# Patient Record
Sex: MALE | Race: BLACK OR AFRICAN AMERICAN | Employment: FULL TIME | URBAN - METROPOLITAN AREA
[De-identification: names, ages, dates, MRNs, and addresses within clinical notes are randomized per-mention and may not be internally consistent; named-entity substitution may affect disease eponyms.]

---

## 2020-01-26 ENCOUNTER — HOSPITAL ENCOUNTER (EMERGENCY)
Age: 30
Discharge: HOME | End: 2020-01-27
Attending: EMERGENCY MEDICINE
Payer: MEDICAID

## 2020-01-26 VITALS
SYSTOLIC BLOOD PRESSURE: 121 MMHG | DIASTOLIC BLOOD PRESSURE: 78 MMHG | HEART RATE: 100 BPM | OXYGEN SATURATION: 97 % | TEMPERATURE: 98 F | RESPIRATION RATE: 18 BRPM | BODY MASS INDEX: 30.38 KG/M2 | HEIGHT: 71 IN | WEIGHT: 217 LBS

## 2020-01-26 DIAGNOSIS — S61.214A LACERATION OF RIGHT RING FINGER WITHOUT FOREIGN BODY WITHOUT DAMAGE TO NAIL, INITIAL ENCOUNTER: Primary | ICD-10-CM

## 2020-01-26 PROCEDURE — 99283 EMERGENCY DEPT VISIT LOW MDM: CPT

## 2020-01-26 PROCEDURE — 90471 IMMUNIZATION ADMIN: CPT

## 2020-01-26 PROCEDURE — 90715 TDAP VACCINE 7 YRS/> IM: CPT | Performed by: NURSE PRACTITIONER

## 2020-01-26 PROCEDURE — 74011250636 HC RX REV CODE- 250/636: Performed by: NURSE PRACTITIONER

## 2020-01-26 PROCEDURE — 75810000293 HC SIMP/SUPERF WND  RPR

## 2020-01-26 PROCEDURE — 74011000250 HC RX REV CODE- 250: Performed by: NURSE PRACTITIONER

## 2020-01-26 RX ORDER — LIDOCAINE HYDROCHLORIDE 20 MG/ML
10 INJECTION, SOLUTION INFILTRATION; PERINEURAL
Status: COMPLETED | OUTPATIENT
Start: 2020-01-26 | End: 2020-01-26

## 2020-01-26 RX ORDER — BUPIVACAINE HYDROCHLORIDE 5 MG/ML
1 INJECTION, SOLUTION EPIDURAL; INTRACAUDAL
Status: COMPLETED | OUTPATIENT
Start: 2020-01-26 | End: 2020-01-26

## 2020-01-26 RX ADMIN — LIDOCAINE HYDROCHLORIDE 200 MG: 20 INJECTION, SOLUTION INFILTRATION; PERINEURAL at 23:25

## 2020-01-26 RX ADMIN — BUPIVACAINE HYDROCHLORIDE 5 MG: 5 INJECTION, SOLUTION EPIDURAL; INTRACAUDAL; PERINEURAL at 23:25

## 2020-01-26 RX ADMIN — TETANUS TOXOID, REDUCED DIPHTHERIA TOXOID AND ACELLULAR PERTUSSIS VACCINE, ADSORBED 0.5 ML: 5; 2.5; 8; 8; 2.5 SUSPENSION INTRAMUSCULAR at 23:25

## 2020-01-27 ENCOUNTER — APPOINTMENT (OUTPATIENT)
Dept: GENERAL RADIOLOGY | Age: 30
End: 2020-01-27
Attending: NURSE PRACTITIONER
Payer: MEDICAID

## 2020-01-27 PROCEDURE — 73130 X-RAY EXAM OF HAND: CPT

## 2020-01-27 PROCEDURE — 74011000250 HC RX REV CODE- 250: Performed by: NURSE PRACTITIONER

## 2020-01-27 PROCEDURE — 75810000293 HC SIMP/SUPERF WND  RPR

## 2020-01-27 RX ADMIN — BACITRACIN ZINC, NEOMYCIN SULFATE, POLYMYXIN B SULFATE 1 PACKET: 3.5; 5000; 4 OINTMENT TOPICAL at 00:18

## 2020-01-27 NOTE — ED NOTES
Pt presents ambulatory to ED complaining of scattered lacerations to R 4th and 5th fingers after punching a car window during an argument. L to R 4th knuckle is the largest at 1 cm, bleeding controlled without pressure, ice applied. Pt not sure about tetanus status. Pt reports having feelings of SI earlier today, but is not currently experiencing SI or HI. Pt says he has been off of his anxiety and blood pressure medications since November because, \"I need to find a doctor that works with my insurance. \"  Pt is alert and oriented x 4, RR even and unlabored, skin is warm and dry. Assesment completed and pt updated on plan of care. Emergency Department Nursing Plan of Care       The Nursing Plan of Care is developed from the Nursing assessment and Emergency Department Attending provider initial evaluation. The plan of care may be reviewed in the ED Provider note.     The Plan of Care was developed with the following considerations:   Patient / Family readiness to learn indicated by:verbalized understanding  Persons(s) to be included in education: patient  Barriers to Learning/Limitations:No    Signed     [address], RN    1/26/2020   11:03 PM

## 2020-01-27 NOTE — ED TRIAGE NOTES
Pt with laceration to rt hand after punching a car window.  Bleeding control hand cleansed with normal saline and wrap with gauge and lynn

## 2020-01-27 NOTE — ED NOTES
Discharge instructions were given to the patient by Suresh Betancur RN. The patient left the Emergency Department ambulatory, alert and oriented and in no acute distress with 0 prescriptions. The patient was encouraged to call or return to the ED for worsening issues or problems and was encouraged to schedule a follow up appointment for continuing care. The patient verbalized understanding of discharge instructions and prescriptions, all questions were answered. The patient has no further concerns at this time. Pt to return for suture removal in 1 week.

## 2020-01-27 NOTE — ED PROVIDER NOTES
EMERGENCY DEPARTMENT HISTORY AND PHYSICAL EXAM    Date: 1/26/2020  Patient Name: Zenia Magana    History of Presenting Illness     Chief Complaint   Patient presents with    Hand Pain     According to pt he punched a ca r window. + laceration to thumb, 4th  knuckle         History Provided By: Patient    HPI: Zenia Magana is a 34 y.o. male with a PMH of No significant past medical history who presents with hand pain. Onset today. Patient states he punched a car window. States he sustained a laceration to his right fourth finger. States bleeding has stopped with pressure. Reports limited movement of right fifth and fourth finger. Mild swelling reported but no deformity redness or warmth. Patient is unsure of last tetanus. PCP: None        Past History     Past Medical History:  Past Medical History:   Diagnosis Date    Anxiety     HTN (hypertension)        Past Surgical History:  Past Surgical History:   Procedure Laterality Date    HX HEENT      LEFT EAR       Family History:  History reviewed. No pertinent family history. Social History:  Social History     Tobacco Use    Smoking status: Current Every Day Smoker     Packs/day: 1.00     Years: 1.00     Pack years: 1.00    Smokeless tobacco: Never Used   Substance Use Topics    Alcohol use: Yes     Comment: reports three 40 oz beers per day    Drug use: No       Allergies:  No Known Allergies      Review of Systems   Review of Systems   Constitutional: Negative for chills and fever. Respiratory: Negative for cough. Cardiovascular: Negative for chest pain. Musculoskeletal: Positive for arthralgias and joint swelling. Skin: Positive for wound. Negative for pallor. Neurological: Negative for weakness and numbness. All other systems reviewed and are negative.       Physical Exam     Vitals:    01/26/20 2227   BP: 121/78   Pulse: 100   Resp: 18   Temp: 98 °F (36.7 °C)   SpO2: 97%   Weight: 98.4 kg (217 lb)   Height: 5' 11\" (1.803 m) Physical Exam  Vitals signs and nursing note reviewed. Constitutional:       General: He is not in acute distress. Appearance: He is well-developed. Cardiovascular:      Rate and Rhythm: Normal rate and regular rhythm. Pulses: Normal pulses. Heart sounds: Normal heart sounds. Pulmonary:      Effort: Pulmonary effort is normal.      Breath sounds: Normal breath sounds. Musculoskeletal:      Right wrist: Normal.      Right hand: He exhibits decreased range of motion (flexion of R little finger ), bony tenderness (DIP and PIP of right little finger and PIP of right ring finger) and laceration. He exhibits normal capillary refill, no deformity and no swelling. Normal sensation noted. Normal strength noted. Comments: Radial pulse 2+   Skin:     Findings: Abrasion (R thumb, distal R ring finger ) and laceration present. Comments: 1 cm horizontal laceration to PIP of right ring finger. Neurological:      Mental Status: He is alert and oriented to person, place, and time. GCS: GCS eye subscore is 4. GCS verbal subscore is 5. GCS motor subscore is 6. Cranial Nerves: No cranial nerve deficit. Psychiatric:         Thought Content: Thought content normal.           Diagnostic Study Results     Labs -   No results found for this or any previous visit (from the past 12 hour(s)). Radiologic Studies -   XR HAND RT MIN 3 V   Final Result   IMPRESSION:   No acute fracture. CT Results  (Last 48 hours)    None        CXR Results  (Last 48 hours)    None            Medical Decision Making   I am the first provider for this patient. I reviewed the vital signs, available nursing notes, past medical history, past surgical history, family history and social history. Vital Signs-Reviewed the patient's vital signs.     Records Reviewed: Nursing Notes and Old Medical Records      ED Course as of Jan 27 1955   Mon Jan 27, 2020   2296 Sign out given to Dr Cat Jasmine whom will provide further disposition of patient     [NA]      ED Course User Index  [NA] Zane Grissom NP          Disposition:  Discharge    DISCHARGE NOTE:         Follow-up Information     Follow up With Specialties Details Why [address] [facility] EMERGENCY DEPT Emergency Medicine In 1 week For suture removal [address] [facility]          There are no discharge medications for this patient. Provider Notes (Medical Decision Making):   DDX: laceration, abrasion, boxer fracture, phalanx fracture, hand sprain     Procedures:  Wound Repair  Date/Time: 1/27/2020 12:00 AM  Performed by: NPPreparation: skin prepped with Betadine  Location details: right ring finger  Wound length:2.5 cm or less  Anesthesia: digital block    Anesthesia:  Local Anesthetic: lidocaine 2% without epinephrine and bupivacaine 0.5% without epinephrine  Anesthetic total: 3 mL  Irrigation solution: saline  Irrigation method: syringe  Debridement: none  Skin closure: 5-0 nylon  Number of sutures: 3  Technique: simple  Approximation: close  Dressing: antibiotic ointment  Patient tolerance: Patient tolerated the procedure well with no immediate complications  My total time at bedside, performing this procedure was 1-15 minutes. Please note that this dictation was completed with Dragon, computer voice recognition software. Quite often unanticipated grammatical, syntax, homophones, and other interpretive errors are inadvertently transcribed by the computer software. Please disregard these errors. Additionally, please excuse any errors that have escaped final proofreading. Diagnosis     Clinical Impression:   1.  Laceration of right ring finger without foreign body without damage to nail, initial encounter

## 2020-01-27 NOTE — DISCHARGE INSTRUCTIONS

## 2020-02-04 ENCOUNTER — OFFICE VISIT (OUTPATIENT)
Dept: FAMILY MEDICINE CLINIC | Age: 30
End: 2020-02-04

## 2020-02-04 VITALS
BODY MASS INDEX: 30.44 KG/M2 | SYSTOLIC BLOOD PRESSURE: 124 MMHG | RESPIRATION RATE: 20 BRPM | DIASTOLIC BLOOD PRESSURE: 82 MMHG | HEART RATE: 85 BPM | WEIGHT: 217.4 LBS | OXYGEN SATURATION: 99 % | HEIGHT: 71 IN | TEMPERATURE: 97.1 F

## 2020-02-04 DIAGNOSIS — F10.20 UNCOMPLICATED ALCOHOL DEPENDENCE (HCC): Primary | ICD-10-CM

## 2020-02-04 RX ORDER — LANOLIN ALCOHOL/MO/W.PET/CERES
50 CREAM (GRAM) TOPICAL DAILY
Qty: 30 TAB | Refills: 0 | Status: SHIPPED | OUTPATIENT
Start: 2020-02-04 | End: 2020-06-17 | Stop reason: SDUPTHER

## 2020-02-04 RX ORDER — LANOLIN ALCOHOL/MO/W.PET/CERES
1000 CREAM (GRAM) TOPICAL DAILY
Qty: 30 TAB | Refills: 6 | Status: SHIPPED | OUTPATIENT
Start: 2020-02-04 | End: 2020-06-17 | Stop reason: ALTCHOICE

## 2020-02-04 RX ORDER — BUSPIRONE HYDROCHLORIDE 10 MG/1
10 TABLET ORAL 3 TIMES DAILY
COMMUNITY
End: 2020-06-17 | Stop reason: ALTCHOICE

## 2020-02-04 RX ORDER — PAROXETINE 30 MG/1
30 TABLET, FILM COATED ORAL DAILY
Qty: 30 TAB | Refills: 0 | Status: SHIPPED | OUTPATIENT
Start: 2020-02-04 | End: 2022-08-16 | Stop reason: ALTCHOICE

## 2020-02-04 RX ORDER — TOPIRAMATE 50 MG/1
TABLET, FILM COATED ORAL DAILY
COMMUNITY
End: 2020-06-17 | Stop reason: SDUPTHER

## 2020-02-04 RX ORDER — NALTREXONE HYDROCHLORIDE 50 MG/1
50 TABLET, FILM COATED ORAL DAILY
Qty: 30 TAB | Refills: 0 | Status: SHIPPED | OUTPATIENT
Start: 2020-02-04 | End: 2020-06-17 | Stop reason: ALTCHOICE

## 2020-02-04 NOTE — PROGRESS NOTES
Name and  verified      Chief Complaint   Patient presents with   [address] Patient       Patient stated have not had a PCP. Patient stated takes blood pressure medications unsure of dose, name and directions encouraged to call or bring on next office visit patient acknowledged understanding.

## 2020-02-04 NOTE — PROGRESS NOTES
HISTORY OF PRESENT ILLNESS  Mj Spicer is a 34 y.o. male. HPI  Pt states that he does daily of beers 4x40 z, he also states that [place] member states that he recently finsihed the rehab and was stationed and compliant, Stayed in rehab of >30 days,   Pt LFT and few vitamins were checked secondary to his etoh dependence, currently likes his daily beers, but slowed down, denies nausea vomiting, has no abd pain, having nl bowl movement, having no rash, is a smoker,     Depression with Anxiety state no fhx of bipolar state    Patient state that thins are not getting better: pt states that  unable to sleep, , unable to concentrate at work and at home at this time, +feeling anxius, no guilty feeling, has no nl interest to do things till he drinks, feeling depressed, nl appetite,  No Hoplessness, not wanted to heart self or others, no weight gain or loss,    In addition pt states that is not the first time,  no hx of physical nor of mental abuse, and currently is feeling safe in general.    Current Outpatient Medications   Medication Sig Dispense Refill    busPIRone (BUSPAR) 10 mg tablet Take 10 mg by mouth three (3) times daily.  topiramate (TOPAMAX) 50 mg tablet Take  by mouth daily.  naltrexone (DEPADE) 50 mg tablet Take 1 Tab by mouth daily. 30 Tab 0    PARoxetine (PAXIL) 30 mg tablet Take 1 Tab by mouth daily. 30 Tab 0    cyanocobalamin (VITAMIN B-12) 1,000 mcg tablet Take 1 Tab by mouth daily. 30 Tab 6    pyridoxine, vitamin B6, (VITAMIN B-6) 50 mg tablet Take 1 Tab by mouth daily. 30 Tab 0     No Known Allergies  Past Medical History:   Diagnosis Date    Anxiety     HTN (hypertension)      Past Surgical History:   Procedure Laterality Date    HX HEENT      LEFT EAR     No family history on file.   Social History     Tobacco Use    Smoking status: Current Every Day Smoker     Packs/day: 1.00     Years: 12.00     Pack years: 12.00     Types: Cigarettes     Start date: 2008    Smokeless tobacco: Never Used   Substance Use Topics    Alcohol use: Yes     Comment: reports three 40 oz beers per day      Lab Results   Component Value Date/Time    Glucose 86 11/12/2014 12:49 AM    Creatinine 0.95 11/12/2014 12:49 AM      No results found for: CHOL, CHOLPOCT, HDL, LDL, LDLC, LDLCPOC, LDLCEXT, TRIGL, TGLPOCT, CHHD, CHHDX     Review of Systems   Constitutional: Positive for malaise/fatigue. Negative for chills and fever. HENT: Negative for ear pain and nosebleeds. Eyes: Negative for blurred vision, pain and discharge. Respiratory: Negative for shortness of breath. Cardiovascular: Negative for chest pain and leg swelling. Gastrointestinal: Negative for constipation, diarrhea, nausea and vomiting. Genitourinary: Negative for frequency. Musculoskeletal: Positive for myalgias. Negative for joint pain. Skin: Negative for itching and rash. Neurological: Negative for headaches. Psychiatric/Behavioral: Positive for depression. Negative for hallucinations, substance abuse and suicidal ideas. The patient is nervous/anxious and has insomnia. Physical Exam  Vitals signs and nursing note reviewed. Constitutional:       Appearance: He is well-developed. HENT:      Head: Normocephalic and atraumatic. Mouth/Throat:      Pharynx: No oropharyngeal exudate. Eyes:      Conjunctiva/sclera: Conjunctivae normal.   Neck:      Musculoskeletal: Normal range of motion and neck supple. Cardiovascular:      Rate and Rhythm: Normal rate and regular rhythm. Heart sounds: Normal heart sounds. No murmur. Pulmonary:      Effort: Pulmonary effort is normal. No respiratory distress. Breath sounds: Normal breath sounds. Abdominal:      General: Bowel sounds are normal. There is no distension. Palpations: Abdomen is soft. Tenderness: There is no rebound. Musculoskeletal:         General: No tenderness. Skin:     General: Skin is warm.       Findings: No erythema. Neurological:      Mental Status: He is alert and oriented to person, place, and time. Psychiatric:         Attention and Perception: He is attentive. He does not perceive auditory hallucinations. Mood and Affect: Affect is flat. Speech: Speech is delayed. Behavior: Behavior is slowed, withdrawn and hyperactive. Behavior is not agitated, aggressive or combative. Behavior is cooperative. Thought Content: Thought content normal.         Cognition and Memory: Cognition normal. Memory is impaired. Judgment: Judgment is not impulsive or inappropriate. ASSESSMENT and PLAN  Diagnoses and all orders for this visit:    1. Uncomplicated alcohol dependence ([facility] [address]  -     REFERRAL TO SOCIAL WORK    Other orders  -     naltrexone (DEPADE) 50 mg tablet; Take 1 Tab by mouth daily.  -     PARoxetine (PAXIL) 30 mg tablet; Take 1 Tab by mouth daily. -     cyanocobalamin (VITAMIN B-12) 1,000 mcg tablet; Take 1 Tab by mouth daily. -     pyridoxine, vitamin B6, (VITAMIN B-6) 50 mg tablet; Take 1 Tab by mouth daily.       Pt was told to the Initial tx, he will need some form of alcohol counseling and participation in a mutual help group,   Was told that his med the Naltrexone can be initiated while the individual is still drinking, pt was told if has Poor adherence, then will consider long-acting injectable, today pt was told that the Abstinence will be the primary goal of treatment of alcohol use disorder,  Family was adivised for the to observe if patients taking medication , the will evalute in 1 wk to addresse the pt's Response to tx,  pt and the family agreed with todays recommend, >40min spent with pt's

## 2020-02-04 NOTE — LETTER
NOTIFICATION RETURN TO WORK / SCHOOL 
 
2/4/2020 10:31 AM 
 
Mr. Santosh Bar [address] [facility] [address] To Whom It May Concern: 
 
Santosh Bar is currently under the care of [address] [facility]. He will return to work/school on: 2/4/20120 If there are questions or concerns please have the patient contact our office.  
 
 
 
Sincerely, 
 
 
Wendy Cardenas MD

## 2020-02-29 ENCOUNTER — HOSPITAL ENCOUNTER (EMERGENCY)
Age: 30
Discharge: HOME | End: 2020-03-01
Attending: EMERGENCY MEDICINE | Admitting: EMERGENCY MEDICINE
Payer: MEDICAID

## 2020-02-29 ENCOUNTER — APPOINTMENT (OUTPATIENT)
Dept: GENERAL RADIOLOGY | Age: 30
End: 2020-02-29
Attending: EMERGENCY MEDICINE
Payer: MEDICAID

## 2020-02-29 DIAGNOSIS — R07.9 CHEST PAIN, UNSPECIFIED TYPE: Primary | ICD-10-CM

## 2020-02-29 LAB — TROPONIN I SERPL-MCNC: <0.05 NG/ML

## 2020-02-29 PROCEDURE — 99284 EMERGENCY DEPT VISIT MOD MDM: CPT

## 2020-02-29 PROCEDURE — 74011000250 HC RX REV CODE- 250: Performed by: EMERGENCY MEDICINE

## 2020-02-29 PROCEDURE — 71045 X-RAY EXAM CHEST 1 VIEW: CPT

## 2020-02-29 PROCEDURE — 93005 ELECTROCARDIOGRAM TRACING: CPT

## 2020-02-29 PROCEDURE — 84484 ASSAY OF TROPONIN QUANT: CPT

## 2020-02-29 PROCEDURE — 74011250637 HC RX REV CODE- 250/637: Performed by: EMERGENCY MEDICINE

## 2020-02-29 PROCEDURE — 36415 COLL VENOUS BLD VENIPUNCTURE: CPT

## 2020-02-29 RX ADMIN — LIDOCAINE HYDROCHLORIDE 40 ML: 20 SOLUTION ORAL; TOPICAL at 23:10

## 2020-03-01 VITALS
SYSTOLIC BLOOD PRESSURE: 147 MMHG | RESPIRATION RATE: 12 BRPM | BODY MASS INDEX: 29.82 KG/M2 | TEMPERATURE: 98.5 F | HEIGHT: 71 IN | HEART RATE: 83 BPM | WEIGHT: 213 LBS | DIASTOLIC BLOOD PRESSURE: 95 MMHG | OXYGEN SATURATION: 97 %

## 2020-03-01 NOTE — ED NOTES
Pt reports GI cocktail did not have any affect on pain. Will make provider aware. Pt resting contently in room, in no acute distress, and updated on plan of care. Call light within reach and pt has family at bedside.

## 2020-03-01 NOTE — ED PROVIDER NOTES
EMERGENCY DEPARTMENT HISTORY AND PHYSICAL EXAM      Date: 2/29/2020  Patient Name: Stanton Regalado    History of Presenting Illness     Chief Complaint   Patient presents with    Headache    Chest Pain       History Provided By: Patient    HPI: Stanton Regalado, 34 y.o. male with PMHx significant for no medical history, presents by private vehicle to the ED with cc of central chest pain. This is a 19-year-old male with central chest pain for 3 days. No nausea vomiting or diarrhea with this. No fever or cough. Patient states the pain feels like heartburn. He has no history of heart conditions or lung conditions. There are no other complaints, changes, or physical findings at this time. PCP: Coco Reynolds MD    Current Outpatient Medications   Medication Sig Dispense Refill    busPIRone (BUSPAR) 10 mg tablet Take 10 mg by mouth three (3) times daily.  topiramate (TOPAMAX) 50 mg tablet Take  by mouth daily.  naltrexone (DEPADE) 50 mg tablet Take 1 Tab by mouth daily. 30 Tab 0    PARoxetine (PAXIL) 30 mg tablet Take 1 Tab by mouth daily. 30 Tab 0    cyanocobalamin (VITAMIN B-12) 1,000 mcg tablet Take 1 Tab by mouth daily. 30 Tab 6    pyridoxine, vitamin B6, (VITAMIN B-6) 50 mg tablet Take 1 Tab by mouth daily. 30 Tab 0       Past History     Past Medical History:  Past Medical History:   Diagnosis Date    Anxiety     HTN (hypertension)        Past Surgical History:  Past Surgical History:   Procedure Laterality Date    HX HEENT      LEFT EAR       Family History:  History reviewed. No pertinent family history.     Social History:  Social History     Tobacco Use    Smoking status: Current Every Day Smoker     Packs/day: 1.00     Years: 12.00     Pack years: 12.00     Types: Cigarettes     Start date: 2008    Smokeless tobacco: Never Used   Substance Use Topics    Alcohol use: Yes     Comment: reports three 40 oz beers per day    Drug use: Yes     Types: Cocaine       Allergies:  No Known Allergies      Review of Systems   Review of Systems   Constitutional: Negative for chills and fever. HENT: Negative for congestion, rhinorrhea, sneezing and sore throat. Eyes: Negative for redness and visual disturbance. Respiratory: Negative for shortness of breath. Cardiovascular: Positive for chest pain. Negative for leg swelling. Gastrointestinal: Negative for abdominal pain, nausea and vomiting. Genitourinary: Negative for difficulty urinating and frequency. Musculoskeletal: Negative for back pain, myalgias and neck stiffness. Skin: Negative for rash. Neurological: Negative for dizziness, syncope, weakness and headaches. Hematological: Negative for adenopathy. All other systems reviewed and are negative. Physical Exam   Physical Exam  Vitals signs and nursing note reviewed. Constitutional:       Appearance: Normal appearance. He is well-developed. HENT:      Head: Normocephalic and atraumatic. Neck:      Musculoskeletal: Full passive range of motion without pain, normal range of motion and neck supple. Cardiovascular:      Rate and Rhythm: Normal rate and regular rhythm. Pulses: Normal pulses. Heart sounds: Normal heart sounds. No murmur. Pulmonary:      Effort: Pulmonary effort is normal. No respiratory distress. Breath sounds: Normal breath sounds. Chest:      Chest wall: No tenderness. Abdominal:      General: Bowel sounds are normal.      Palpations: Abdomen is soft. Tenderness: There is no abdominal tenderness. There is no guarding or rebound. Skin:     General: Skin is warm and dry. Findings: No erythema or rash. Neurological:      Mental Status: He is alert and oriented to person, place, and time. Psychiatric:         Speech: Speech normal.         Behavior: Behavior normal.         Thought Content:  Thought content normal.         Judgment: Judgment normal.         Diagnostic Study Results     Labs -     Recent Results (from the past 12 hour(s))   EKG, 12 LEAD, INITIAL    Collection Time: 02/29/20 10:55 PM   Result Value Ref Range    Ventricular Rate 89 BPM    Atrial Rate 89 BPM    P-R Interval 156 ms    QRS Duration 82 ms    Q-T Interval 364 ms    QTC Calculation (Bezet) 442 ms    Calculated P Axis 72 degrees    Calculated R Axis 65 degrees    Calculated T Axis 43 degrees    Diagnosis       Normal sinus rhythm  Early repolarization  Normal ECG  When compared with ECG of 27-APR-2012 16:45,  QT has lengthened     TROPONIN I    Collection Time: 02/29/20 11:20 PM   Result Value Ref Range    Troponin-I, Qt. <0.05 <0.05 ng/mL       Radiologic Studies -   XR CHEST PORT   Final Result   Impression:   No acute cardiopulmonary process. CT Results  (Last 48 hours)    None        CXR Results  (Last 48 hours)               02/29/20 2325  XR CHEST PORT Final result    Impression:  Impression:   No acute cardiopulmonary process. Narrative:  Chest portable AP       History: Chest pain       Comparison: 4/27/2012       Findings: The lungs are well expanded. No focal consolidation, pleural   effusion, or pneumothorax. The cardiomediastinal silhouette is unremarkable. The visualized osseous structures are unremarkable. Medical Decision Making   I am the first provider for this patient. I reviewed the vital signs, available nursing notes, past medical history, past surgical history, family history and social history. Vital Signs-Reviewed the patient's vital signs. Patient Vitals for the past 12 hrs:   Temp Pulse Resp BP SpO2   03/01/20 0000  83 12 (!) 147/95 97 %   02/29/20 2306  88 14  99 %   02/29/20 2305    (!) 139/97    02/29/20 2242 98.5 °F (36.9 °C) 93 16 (!) 150/93 98 %         Records Reviewed: Nursing Notes    Provider Notes (Medical Decision Making):   Chest wall pain versus arrhythmia. ED Course:   Initial assessment performed.  The patients presenting problems have been discussed, and they are in agreement with the care plan formulated and outlined with them. I have encouraged them to ask questions as they arise throughout their visit. EKG is normal sinus rhythm with no ectopy or ST changes. Troponin is also negative as is chest x-ray and patient is suitable for discharge to home. He feels better with a GI cocktail. Disposition:  Patient informed of results of workup and is comfortable with discharge to home to follow up with PCP. They are instructed to return as needed for worsening condition. PLAN:  1. Discharge Medication List as of 3/1/2020 12:05 AM        2. Follow-up Information     Follow up With Specialties Details Why Contact Info    Marko Aguilar MD Family Practice Schedule an appointment as soon as possible for a visit  [address] [place] [address]       [facility] EMERGENCY DEPT Emergency Medicine  As needed, If symptoms worsen Roman Solares  [phone]        Return to ED if worse     Diagnosis     Clinical Impression:   1.  Chest pain, unspecified type

## 2020-03-01 NOTE — ED NOTES
Pt presents to ED ambulatory complaining of \"burning\" chest pain x 3 days. Pt denies SOB or abdominal pain. Pt is alert and oriented x 4, RR even and unlabored, skin is warm and dry. Assessment completed and pt updated on plan of care. Emergency Department Nursing Plan of Care       The Nursing Plan of Care is developed from the Nursing assessment and Emergency Department Attending provider initial evaluation. The plan of care may be reviewed in the ED Provider note.     The Plan of Care was developed with the following considerations:   Patient / Family readiness to learn indicated by:verbalized understanding  Persons(s) to be included in education: patient  Barriers to Learning/Limitations:No    Signed     Sammy Jan 2/29/2020   10:58 PM

## 2020-03-01 NOTE — ED NOTES
Discharge instructions were given to the patient by Eric Guillaume RN. The patient left the Emergency Department ambulatory, alert and oriented and in no acute distress with 0 prescriptions. The patient was encouraged to call or return to the ED for worsening issues or problems and was encouraged to schedule a follow up appointment for continuing care. The patient verbalized understanding of discharge instructions and prescriptions, all questions were answered. The patient has no further concerns at this time.

## 2020-03-01 NOTE — DISCHARGE INSTRUCTIONS

## 2020-03-01 NOTE — ED TRIAGE NOTES
Headaches for \"awhile in my life\" but I never got it checked. Sharp chest pain starting three days ago.   Reports that it hurts to breathe

## 2020-03-02 LAB
ATRIAL RATE: 89 BPM
CALCULATED P AXIS, ECG09: 72 DEGREES
CALCULATED R AXIS, ECG10: 65 DEGREES
CALCULATED T AXIS, ECG11: 43 DEGREES
DIAGNOSIS, 93000: NORMAL
P-R INTERVAL, ECG05: 156 MS
Q-T INTERVAL, ECG07: 364 MS
QRS DURATION, ECG06: 82 MS
QTC CALCULATION (BEZET), ECG08: 442 MS
VENTRICULAR RATE, ECG03: 89 BPM

## 2020-05-07 ENCOUNTER — VIRTUAL VISIT (OUTPATIENT)
Dept: FAMILY MEDICINE CLINIC | Age: 30
End: 2020-05-07

## 2020-05-07 DIAGNOSIS — F48.9 NO DIAGNOSIS ON AXIS I: Primary | ICD-10-CM

## 2020-05-07 NOTE — PROGRESS NOTES
Attempted to contact client for scheduled virtual visit. He did not respond to Doxy texts. Called him on phone. He did answer, but seem perplexed as to why this writer was calling. Explained to him that he was referred for counseling by PCP. He states he could not talk today but did want to reschedule. Appt. Made for May 21 @3pm. Client informed.

## 2020-05-21 ENCOUNTER — DOCUMENTATION ONLY (OUTPATIENT)
Dept: FAMILY MEDICINE CLINIC | Age: 30
End: 2020-05-21

## 2020-05-21 NOTE — PROGRESS NOTES
Attempted to reach client via doxy platform for scheduled virtual visit--no response. Tried to call his phone--also no response. Left voicemail that client will need to call office to reschedule.

## 2020-06-17 ENCOUNTER — VIRTUAL VISIT (OUTPATIENT)
Dept: FAMILY MEDICINE CLINIC | Age: 30
End: 2020-06-17

## 2020-06-17 DIAGNOSIS — F33.9 RECURRENT DEPRESSIVE DISORDER (HCC): ICD-10-CM

## 2020-06-17 DIAGNOSIS — F10.20 UNCOMPLICATED ALCOHOL DEPENDENCE (HCC): Primary | ICD-10-CM

## 2020-06-17 DIAGNOSIS — Z71.89 ADVICE GIVEN ABOUT COVID-19 VIRUS INFECTION: ICD-10-CM

## 2020-06-17 DIAGNOSIS — R53.83 OTHER FATIGUE: ICD-10-CM

## 2020-06-17 RX ORDER — BUSPIRONE HYDROCHLORIDE 10 MG/1
10 TABLET ORAL 3 TIMES DAILY
Qty: 42 TAB | Refills: 0 | Status: SHIPPED | OUTPATIENT
Start: 2020-06-17 | End: 2020-07-01

## 2020-06-17 RX ORDER — TOPIRAMATE 50 MG/1
50 TABLET, FILM COATED ORAL 2 TIMES DAILY
Qty: 30 TAB | Refills: 0 | Status: SHIPPED | OUTPATIENT
Start: 2020-06-17 | End: 2022-08-16 | Stop reason: ALTCHOICE

## 2020-06-17 RX ORDER — LANOLIN ALCOHOL/MO/W.PET/CERES
50 CREAM (GRAM) TOPICAL DAILY
Qty: 30 TAB | Refills: 0 | Status: SHIPPED | OUTPATIENT
Start: 2020-06-17 | End: 2022-08-16 | Stop reason: ALTCHOICE

## 2020-06-17 NOTE — PROGRESS NOTES
Chief Complaint   Patient presents with    Medication Evaluation     naltrexone      1. Have you been to the ER, urgent care clinic since your last visit? Hospitalized since your last visit? No    2. Have you seen or consulted any other health care providers outside of the [address] [place] [address] since your last visit? Include any pap smears or colon screening. No    Call placed to pt.  Verified patient with two type of identifiers, c/o naltrexone is too strong, stated made him nausea and didn't want to do anything,  Stopped taking med, now is drinking and having increased anxiety

## 2020-06-17 NOTE — PATIENT INSTRUCTIONS
Learning About Alcohol Use Disorder What is alcohol use disorder? Alcohol use disorder means that a person drinks alcohol even though it causes harm to themselves or others. It can range from mild to severe. The more signs of this disorder you have, the more severe it may be. Moderate to severe alcohol use disorder is sometimes called addiction. People who have it may find it hard to control their use of alcohol. People who have this disorder may argue with others about how much they're drinking. Their job may be affected because of drinking. They may drink when it's dangerous or illegal, such as when they drive. They also may have a strong need, or craving, to drink. They may feel like they must drink just to get by. Their drinking may increase their risk of getting hurt or being in a car crash. Over time, drinking too much alcohol may cause health problems. These may include high blood pressure, liver problems, or problems with digestion. What are the signs? Maybe you've wondered about your alcohol habits, or how to tell if your drinking is becoming a problem. Here are some of the signs of alcohol use disorder. You may have it if you have two or more of the following signs: 
· You drink larger amounts of alcohol than you ever meant to. Or you've been drinking for a longer time than you ever meant to. · You can't cut down or control your use. Or you constantly wish you could cut down. · You spend a lot of time getting or drinking alcohol or recovering from its effects. · You have strong cravings for alcohol. · You can no longer do your main jobs at work, at school, or at home. · You keep drinking alcohol, even though your use hurts your relationships. · You have stopped doing important activities because of your alcohol use. · You drink alcohol in situations where doing so is dangerous. · You keep drinking alcohol even though you know it's causing health problems. · You need more and more alcohol to get the same effect, or you get less effect from the same amount over time. This is called tolerance. · You have uncomfortable symptoms when you stop drinking alcohol or use less. This is called withdrawal. 
Alcohol use disorder can range from mild to severe. The more signs you have, the more severe the disorder may be. Moderate to severe alcohol use disorder is sometimes called addiction. You might not realize that your drinking is a problem. You might not drink large amounts when you drink. Or you might go for days or weeks between drinking episodes. But even if you don't drink very often, your drinking could still be harmful and put you at risk. How is alcohol use disorder treated? Getting help is up to you. But you don't have to do it alone. There are many people and kinds of treatments that can help. Treatment for alcohol use disorder can include: · Group therapy, one or more types of counseling, and alcohol education. · Medicines that help to: 
? Reduce withdrawal symptoms and help you safely stop drinking. ? Reduce cravings for alcohol. · Support groups. These groups include Alcoholics Anonymous and AGC (Self-Management and Recovery Training). Some people are able to stop or cut back on drinking with help from a counselor. People who have moderate to severe alcohol use disorder may need medical treatment. They may need to stay in a hospital or treatment center. You may have a treatment team to help you. This team may include a psychologist or psychiatrist, counselors, doctors, social workers, nurses, and a . A  helps plan and manage your treatment. Follow-up care is a key part of your treatment and safety. Be sure to make and go to all appointments, and call your doctor if you are having problems. It's also a good idea to know your test results and keep a list of the medicines you take. Where can you learn more? Go to http://shauna-alban.info/ Enter [id number] in the search box to learn more about \"Learning About Alcohol Use Disorder. \" Current as of: August 22, 2019               Content Version: 12.5 © 0710-8265 [employer]. Care instructions adapted under license by [employer] (which disclaims liability or warranty for this information). If you have questions about a medical condition or this instruction, always ask your healthcare professional. [place] [address] any warranty or liability for your use of this information.

## 2020-06-17 NOTE — PROGRESS NOTES
HISTORY OF PRESENT ILLNESS  Yenny Garg is a 27 y.o. male. HPI   Today's Pt main concerns were provided on virtual visit and Video telemed format,  Present on VV for the concern of the current medical conditions,  pt is w/ comorbid history and unaware if the pt has been exposed to covid-19 individual,   Pt Have been staying at home for couple of wks,  pt has no fever no cough no dyspnea, no ha, not dizzy, nl smell nl taste, no N/V/D, no body ache. Present stating that he has had some family stress could not deal with the stress and had a relapse and went back on drinking but needs help to stop his bad habit currently drinks 3x 24 oz etoh beer daily, today pt was told that he will not be allowed to drink and take his given meds, for which pt agreed with the verbal agreement to do so, today stating he is sad and Restarted it back again, also stating that he stopped all his meds but wanted to go back on the meds which helped him greatly, pt Went to rehab for the first time  And stopped all his drinking habit, and he finished his rehab in Feb of 2020,  Also stating that he Lost his relationship, pt Was seen by the counselor as well, but not compliant with f/u, Last seen in 2/2020, today stating that he Need help as he states to back on be sober, requesting to be on Xanax,    pt states and reports of feeling anxius, some guilty feeling, no Hoplessness, patient at this time has ns/nh,ni,nh, and some trouble with weight gain, less ability of sleep, okay ablitiy  to concentrate at work (patient is able to work 8-12 hrs per week at this time) and at home with the current medications, and all together a safe feeling at home and at work         Current Outpatient Medications   Medication Sig Dispense Refill    busPIRone (BUSPAR) 10 mg tablet Take 10 mg by mouth three (3) times daily.  pyridoxine, vitamin B6, (VITAMIN B-6) 50 mg tablet Take 1 Tab by mouth daily.  30 Tab 0    topiramate (TOPAMAX) 50 mg tablet Take  by mouth daily.  naltrexone (DEPADE) 50 mg tablet Take 1 Tab by mouth daily. (Patient not taking: Reported on 6/17/2020) 30 Tab 0    PARoxetine (PAXIL) 30 mg tablet Take 1 Tab by mouth daily. (Patient not taking: Reported on 6/17/2020) 30 Tab 0    cyanocobalamin (VITAMIN B-12) 1,000 mcg tablet Take 1 Tab by mouth daily. (Patient not taking: Reported on 6/17/2020) 30 Tab 6     No Known Allergies  Past Medical History:   Diagnosis Date    Anxiety     HTN (hypertension)      Past Surgical History:   Procedure Laterality Date    HX HEENT      LEFT EAR     Family History   Problem Relation Age of Onset    No Known Problems Mother     No Known Problems Father      Social History     Tobacco Use    Smoking status: Current Every Day Smoker     Packs/day: 1.00     Years: 12.00     Pack years: 12.00     Types: Cigarettes     Start date: 2008    Smokeless tobacco: Never Used   Substance Use Topics    Alcohol use: Yes     Comment: reports three 40 oz beers per day      Lab Results   Component Value Date/Time    Glucose 86 11/12/2014 12:49 AM    Creatinine 0.95 11/12/2014 12:49 AM      No results found for: TSH, TSH2, TSH3, TSHP, TSHELE, TSHEXT, TT3, T3U, T3UP, FRT3, FT3, FT4, FT4P, T4, T4P, FT4T, TT7, TSHEXT      Review of Systems   Constitutional: Negative for chills and fever. HENT: Negative for ear pain and nosebleeds. Eyes: Negative for blurred vision, pain and discharge. Respiratory: Negative for shortness of breath. Cardiovascular: Negative for chest pain and leg swelling. Gastrointestinal: Negative for constipation, diarrhea, nausea and vomiting. Genitourinary: Negative for frequency. Musculoskeletal: Positive for back pain, joint pain and myalgias. Skin: Negative for itching and rash. Psychiatric/Behavioral: Negative for depression. The patient has insomnia. The patient is not nervous/anxious. Physical Exam  Constitutional:       Appearance: Normal appearance.    HENT:      Head: Normocephalic and atraumatic. Nose: Nose normal. No congestion. Neurological:      Mental Status: He is alert and oriented to person, place, and time. Psychiatric:         Attention and Perception: Attention and perception normal. He is attentive. He does not perceive auditory or visual hallucinations. Mood and Affect: Mood is depressed. Mood is not anxious or elated. Affect is labile. Affect is not blunt, tearful or inappropriate. Speech: Speech normal. Speech is not rapid and pressured, delayed, slurred or tangential.         Behavior: Behavior is slowed and hyperactive. Behavior is not agitated, aggressive, withdrawn or combative. Thought Content: Thought content is paranoid. Thought content is not delusional. Thought content does not include homicidal or suicidal ideation. Thought content does not include homicidal or suicidal plan. Cognition and Memory: Cognition and memory normal. Cognition is not impaired. Memory is not impaired. He does not exhibit impaired recent memory or impaired remote memory. Judgment: Judgment is inappropriate. Judgment is not impulsive. ASSESSMENT and PLAN  Diagnoses and all orders for this visit:    1. Uncomplicated alcohol dependence ([facility] [address]  -     REFERRAL TO SOCIAL WORK    2. Other fatigue  -     REFERRAL TO SOCIAL WORK    3. Recurrent depressive disorder ([facility] [address]  -     REFERRAL TO SOCIAL WORK    4. Advice given about COVID-19 virus infection  -     REFERRAL TO SOCIAL WORK    Other orders  -     topiramate (TOPAMAX) 50 mg tablet; Take 1 Tab by mouth two (2) times a day. -     pyridoxine, vitamin B6, (VITAMIN B-6) 50 mg tablet; Take 1 Tab by mouth daily. -     busPIRone (BUSPAR) 10 mg tablet; Take 1 Tab by mouth three (3) times daily for 14 days.       Benzo request denied and re advised on abstinency and compliancy pt agreed and talked to in detailed verbal promised that he will not drink etoh if he can be restarted on his meds,   Depression with anxiety in a stable condition, not suicidal, start antianxiety and calming medication for now will reevaluate in 2 w for progression   in addition Counseling , social support, spiritual belonging, inc physical activity, all offered,  compliance advised, patient was told that should not mix any of current medication with any other illicit drugs, should not drink any alcoholic beverages while on such medication patient was also told to not operate any machinery while under the influence patient acknowledged understanding and agreed with today's recommendation in addition patient was told to call for any concern

## 2020-09-24 ENCOUNTER — VIRTUAL VISIT (OUTPATIENT)
Dept: FAMILY MEDICINE CLINIC | Age: 30
End: 2020-09-24
Payer: MEDICAID

## 2020-09-24 DIAGNOSIS — Z71.89 ADVICE GIVEN ABOUT COVID-19 VIRUS INFECTION: ICD-10-CM

## 2020-09-24 DIAGNOSIS — F33.9 RECURRENT DEPRESSIVE DISORDER (HCC): ICD-10-CM

## 2020-09-24 DIAGNOSIS — F10.20 UNCOMPLICATED ALCOHOL DEPENDENCE (HCC): Primary | ICD-10-CM

## 2020-09-24 DIAGNOSIS — R53.83 OTHER FATIGUE: ICD-10-CM

## 2020-09-24 PROCEDURE — 99214 OFFICE O/P EST MOD 30 MIN: CPT | Performed by: FAMILY MEDICINE

## 2020-09-24 RX ORDER — BUSPIRONE HYDROCHLORIDE 15 MG/1
15 TABLET ORAL 3 TIMES DAILY
Qty: 90 TAB | Refills: 0 | Status: SHIPPED | OUTPATIENT
Start: 2020-09-24 | End: 2020-10-19

## 2020-09-24 RX ORDER — HYDROXYZINE 25 MG/1
25 TABLET, FILM COATED ORAL
Qty: 90 TAB | Refills: 0 | Status: SHIPPED | OUTPATIENT
Start: 2020-09-24 | End: 2020-10-19

## 2020-09-24 RX ORDER — NALTREXONE HYDROCHLORIDE 50 MG/1
50 TABLET, FILM COATED ORAL DAILY
Qty: 30 TAB | Refills: 2
Start: 2020-09-24 | End: 2022-08-16 | Stop reason: ALTCHOICE

## 2020-09-24 RX ORDER — BUSPIRONE HYDROCHLORIDE 10 MG/1
10 TABLET ORAL 3 TIMES DAILY
COMMUNITY
End: 2020-09-24 | Stop reason: DRUGHIGH

## 2020-09-24 NOTE — PATIENT INSTRUCTIONS

## 2020-09-24 NOTE — PROGRESS NOTES
Chief Complaint   Patient presents with    Anxiety     1. Have you been to the ER, urgent care clinic since your last visit? Hospitalized since your last visit? No    2. Have you seen or consulted any other health care providers outside of the [address] [place] [address] since your last visit? Include any pap smears or colon screening. No    Call placed to pt. Verified patient with two type of identifiers. requesting to re evaluate his meds for anxiety.   They are not working

## 2020-10-17 DIAGNOSIS — R53.83 OTHER FATIGUE: ICD-10-CM

## 2020-10-17 DIAGNOSIS — F33.9 RECURRENT DEPRESSIVE DISORDER (HCC): ICD-10-CM

## 2020-10-17 DIAGNOSIS — F10.20 UNCOMPLICATED ALCOHOL DEPENDENCE (HCC): ICD-10-CM

## 2020-10-17 DIAGNOSIS — Z71.89 ADVICE GIVEN ABOUT COVID-19 VIRUS INFECTION: ICD-10-CM

## 2020-10-19 RX ORDER — HYDROXYZINE 25 MG/1
TABLET, FILM COATED ORAL
Qty: 90 TAB | Refills: 0 | Status: SHIPPED | OUTPATIENT
Start: 2020-10-19 | End: 2022-05-25 | Stop reason: ALTCHOICE

## 2020-10-19 RX ORDER — BUSPIRONE HYDROCHLORIDE 15 MG/1
TABLET ORAL
Qty: 90 TAB | Refills: 0 | Status: SHIPPED | OUTPATIENT
Start: 2020-10-19 | End: 2022-04-27 | Stop reason: SDUPTHER

## 2020-11-29 ENCOUNTER — HOSPITAL ENCOUNTER (EMERGENCY)
Age: 30
Discharge: HOME | End: 2020-11-29
Attending: EMERGENCY MEDICINE | Admitting: EMERGENCY MEDICINE
Payer: MEDICAID

## 2020-11-29 VITALS
HEART RATE: 94 BPM | HEIGHT: 71 IN | WEIGHT: 210.32 LBS | BODY MASS INDEX: 29.44 KG/M2 | SYSTOLIC BLOOD PRESSURE: 166 MMHG | OXYGEN SATURATION: 100 % | DIASTOLIC BLOOD PRESSURE: 99 MMHG | RESPIRATION RATE: 18 BRPM | TEMPERATURE: 98.3 F

## 2020-11-29 DIAGNOSIS — N45.1 EPIDIDYMITIS, RIGHT: Primary | ICD-10-CM

## 2020-11-29 PROCEDURE — 99283 EMERGENCY DEPT VISIT LOW MDM: CPT

## 2020-11-29 PROCEDURE — 74011250637 HC RX REV CODE- 250/637: Performed by: EMERGENCY MEDICINE

## 2020-11-29 PROCEDURE — 96372 THER/PROPH/DIAG INJ SC/IM: CPT

## 2020-11-29 PROCEDURE — 74011000250 HC RX REV CODE- 250: Performed by: EMERGENCY MEDICINE

## 2020-11-29 PROCEDURE — 74011250636 HC RX REV CODE- 250/636: Performed by: EMERGENCY MEDICINE

## 2020-11-29 RX ORDER — AZITHROMYCIN 500 MG/1
1000 TABLET, FILM COATED ORAL
Status: COMPLETED | OUTPATIENT
Start: 2020-11-29 | End: 2020-11-29

## 2020-11-29 RX ADMIN — AZITHROMYCIN MONOHYDRATE 1000 MG: 500 TABLET ORAL at 12:13

## 2020-11-29 RX ADMIN — LIDOCAINE HYDROCHLORIDE 250 MG: 10 INJECTION, SOLUTION EPIDURAL; INFILTRATION; INTRACAUDAL; PERINEURAL at 12:13

## 2020-11-29 NOTE — ED PROVIDER NOTES
EMERGENCY DEPARTMENT HISTORY AND PHYSICAL EXAM      Date: 11/29/2020  Patient Name: Chuyita Paniagua  Patient Age and Sex: [id number] [place] y.o. male    History of Presenting Illness     Chief Complaint   Patient presents with    Testicle Swelling       History Provided By: Patient    Ability to gather history was limited by:     HPI: Chuyita Painagua, [id number] [place] y.o. male complains of mild swelling of the scrotum and testicles starting last night. Seems to be more on the right than the left. Pain is dull and minimal.  No discharge or dysuria from the penis. He has had a few recent unprotected sexual partners. Location:    Quality:      Severity:    Duration:   Timing:      Context:    Modifying factors:   Associated symptoms:       The patient's medical, surgical, family, and social history on file were reviewed by me today. Past Medical History:   Diagnosis Date    Anxiety     HTN (hypertension)      Past Surgical History:   Procedure Laterality Date    HX HEENT      LEFT EAR       PCP: Julianne Mcknight MD    Past History     Past Medical History:  Past Medical History:   Diagnosis Date    Anxiety     HTN (hypertension)        Past Surgical History:  Past Surgical History:   Procedure Laterality Date    HX HEENT      LEFT EAR       Family History:  Family History   Problem Relation Age of Onset    No Known Problems Mother     No Known Problems Father        Social History:  Social History     Tobacco Use    Smoking status: Current Every Day Smoker     Packs/day: 1.00     Years: 12.00     Pack years: 12.00     Types: Cigarettes     Start date: 2008    Smokeless tobacco: Never Used   Substance Use Topics    Alcohol use: Yes     Comment: reports three 40 oz beers per day    Drug use: Yes     Types: Cocaine       Allergies:  No Known Allergies    Current Medications:  No current facility-administered medications on file prior to encounter.       Current Outpatient Medications on File Prior to Encounter   Medication Sig Dispense Refill    hydrOXYzine HCL (ATARAX) 25 mg tablet TAKE 1 TABLET BY MOUTH THREE (3) TIMES DAILY AS NEEDED FOR ANXIETY OR AGITATION 90 Tab 0    busPIRone (BUSPAR) 15 mg tablet TAKE 1 TABLET BY MOUTH THREE TIMES A DAY 90 Tab 0    naltrexone (DEPADE) 50 mg tablet Take 1 Tab by mouth daily. 30 Tab 2    topiramate (TOPAMAX) 50 mg tablet Take 1 Tab by mouth two (2) times a day. 30 Tab 0    pyridoxine, vitamin B6, (VITAMIN B-6) 50 mg tablet Take 1 Tab by mouth daily. (Patient not taking: Reported on 9/24/2020) 30 Tab 0    PARoxetine (PAXIL) 30 mg tablet Take 1 Tab by mouth daily. (Patient not taking: Reported on 9/24/2020) 30 Tab 0       Review of Systems   Review of Systems   Constitutional: Negative for fatigue and fever. Gastrointestinal: Negative for abdominal pain. Genitourinary: Positive for scrotal swelling and testicular pain. Negative for discharge, penile pain and penile swelling. All other systems reviewed and are negative. Physical Exam   Vital Signs  Patient Vitals for the past 8 hrs:   Temp Pulse Resp BP SpO2   11/29/20 1130 98.3 °F (36.8 °C) 94 18 (!) 166/99 100 %          Physical Exam  Vitals signs and nursing note reviewed. Constitutional:       General: He is not in acute distress. Appearance: Normal appearance. He is well-developed. He is not ill-appearing. HENT:      Head: Normocephalic and atraumatic. Eyes:      General:         Right eye: No discharge. Left eye: No discharge. Conjunctiva/sclera: Conjunctivae normal.   Cardiovascular:      Heart sounds: No murmur. Abdominal:      General: There is no distension. Palpations: Abdomen is soft. Tenderness: There is no abdominal tenderness. Genitourinary:     Penis: Normal and circumcised. No tenderness, discharge or lesions. Scrotum/Testes: Cremasteric reflex is present. Right: Swelling and varicocele present. Tenderness not present.          Left: Tenderness, swelling or varicocele not present. Comments: Mild swelling of the scrotum, right greater than left. On palpation he seems to have a small varicocele on the right. There are no firm masses. No significant tenderness. Musculoskeletal: Normal range of motion. General: No deformity. Skin:     General: Skin is warm and dry. Findings: No lesion or rash. Neurological:      General: No focal deficit present. Mental Status: He is alert and oriented to person, place, and time. Psychiatric:         Mood and Affect: Mood normal.         Behavior: Behavior normal.         Thought Content: Thought content normal.         Diagnostic Study Results   Labs  No results found for this or any previous visit (from the past 24 hour(s)). Radiologic Studies  No orders to display     CT Results  (Last 48 hours)    None        CXR Results  (Last 48 hours)    None          Procedures   Procedures    Medical Decision Making     I reviewed the patient's most recent Emergency Dept notes and diagnostic tests  in formulating my MDM on today's visit. Provider Notes (Medical Decision Making):   72-year-old male with mild swelling of the scrotum since last night, with minimal pain. On examination he has no concerning tenderness. Normal cremasteric reflex. May perhaps have a varicocele on exam.    My clinical concern for testicular torsion or testicular mass is very low. I offered the patient testicular ultrasound, but this will require waiting at least 2 hours for the technician to arrive and test to be completed. Patient does not want to wait, and based on clinical exam I feel confident that this is not testicular torsion at this time. Patient was treated with ceftriaxone and azithromycin in the emergency department for presumed epididymitis from STI. Advised to return immediately to the emergency department for ultrasound if he develops worsening pain or swelling.     Brooks Ochoa MD  12:01 PM    Consults:    Social History     Tobacco Use    Smoking status: Current Every Day Smoker     Packs/day: 1.00     Years: 12.00     Pack years: 12.00     Types: Cigarettes     Start date: 2008    Smokeless tobacco: Never Used   Substance Use Topics    Alcohol use: Yes     Comment: reports three 40 oz beers per day    Drug use: Yes     Types: Cocaine     Patient Vitals for the past 4 hrs:   Temp Pulse Resp BP SpO2   11/29/20 1130 98.3 °F (36.8 °C) 94 18 (!) 166/99 100 %          Prescriptions from today's ED visit:  Current Discharge Medication List           Medications Administered during ED course:  Medications   cefTRIAXone (ROCEPHIN) 250 mg in lidocaine (PF) (XYLOCAINE) 10 mg/mL (1 %) IM injection (has no administration in time range)   azithromycin (ZITHROMAX) tablet 1,000 mg (has no administration in time range)          Diagnosis and Disposition     Disposition:  Discharged    Clinical Impression:   1. Epididymitis, right        Attestation:  I personally performed the services described in this documentation on this date 11/29/2020 for patient Magnus Franco. Daniele Lopez MD        I was the first provider for this patient on this visit. To the best of my ability I reviewed relevant prior medical records, electrocardiograms, laboratories, and radiologic studies. The patient's presenting problems were discussed, and the patient was in agreement with the care plan formulated and outlined with them. Daniele Lopez MD    Please note that this dictation was completed with Dragon voice recognition software. Quite often unanticipated grammatical, syntax, homophones, and other interpretive errors are inadvertently transcribed by the computer software. Please disregard these errors and excuse any errors that have escaped final proofreading.

## 2020-11-29 NOTE — DISCHARGE INSTRUCTIONS
Your examination today is reassuring and is suggestive of epididymitis, which is a type of testicle infection, usually from sexually transmitted diseases such as gonorrhea or chlamydia. We have treated you today for both of these infections with antibiotics. Your symptoms should resolve over the next few days. If you continue to have swelling or worsening testicular pain in the next couple days, you need to return to the emergency department for further evaluation, especially if you develop severe pain in one testicle, which needs to have an ultrasound done immediately. Your recent sexual partners need to be treated for STD as well.

## 2020-11-29 NOTE — ED TRIAGE NOTES
Patient comes to the ED for evaluation and treatment of scrotal swelling starting last pm.  Reports itching prior to swelling

## 2020-11-29 NOTE — ED NOTES
Discharge instructions were given to the patient by VIVEK Coffey RN. .     The patient left the Emergency Department ambulatory, alert and oriented and in no acute distress with 0 prescription(s). The patient was encouraged to call or return to the ED for worsening symptoms or problems and was encouraged to schedule a follow up appointment for continuing care. Patient leaving ED accompanied by self. The patient verbalized understanding of discharge instructions and prescriptions, all questions were answered. The patient has no further concerns at this time. Patient declined wheelchair transfer upon ED discharge.

## 2020-11-29 NOTE — ED NOTES
Emergency Department Nursing Plan of Care       The Nursing Plan of Care is developed from the Nursing assessment and Emergency Department Attending provider initial evaluation. The plan of care may be reviewed in the ED Provider note. The Plan of Care was developed with the following considerations:   Patient / Family readiness to learn indicated by:verbalized understanding  Persons(s) to be included in education: patient  Barriers to Learning/Limitations:No    Signed     Genette Mood    11/29/2020   12:26 PM    Patient is alert and oriented x 4 and in no acute distress at this time. Respirations are at a regular rate, depth, and pattern. Patient updated on plan of care and has no questions or concerns at this time. Call bell within reach. Will continue to monitor. Please reference nursing assessment.

## 2020-12-14 ENCOUNTER — APPOINTMENT (OUTPATIENT)
Dept: GENERAL RADIOLOGY | Age: 30
End: 2020-12-14
Attending: EMERGENCY MEDICINE
Payer: MEDICAID

## 2020-12-14 ENCOUNTER — HOSPITAL ENCOUNTER (EMERGENCY)
Age: 30
Discharge: HOME | End: 2020-12-14
Attending: EMERGENCY MEDICINE
Payer: MEDICAID

## 2020-12-14 ENCOUNTER — APPOINTMENT (OUTPATIENT)
Dept: ULTRASOUND IMAGING | Age: 30
End: 2020-12-14
Attending: EMERGENCY MEDICINE
Payer: MEDICAID

## 2020-12-14 VITALS
HEART RATE: 91 BPM | RESPIRATION RATE: 16 BRPM | BODY MASS INDEX: 30.4 KG/M2 | DIASTOLIC BLOOD PRESSURE: 102 MMHG | TEMPERATURE: 98.6 F | OXYGEN SATURATION: 99 % | WEIGHT: 217.15 LBS | HEIGHT: 71 IN | SYSTOLIC BLOOD PRESSURE: 142 MMHG

## 2020-12-14 DIAGNOSIS — Z20.2 EXPOSURE TO STD: ICD-10-CM

## 2020-12-14 DIAGNOSIS — L50.9 URTICARIA: Primary | ICD-10-CM

## 2020-12-14 DIAGNOSIS — M53.3 COCCYGEAL PAIN, ACUTE: ICD-10-CM

## 2020-12-14 LAB
APPEARANCE UR: ABNORMAL
BACTERIA URNS QL MICRO: NEGATIVE /HPF
BILIRUB UR QL: NEGATIVE
COLOR UR: ABNORMAL
EPITH CASTS URNS QL MICRO: ABNORMAL /LPF
GLUCOSE UR STRIP.AUTO-MCNC: NEGATIVE MG/DL
HGB UR QL STRIP: NEGATIVE
KETONES UR QL STRIP.AUTO: >80 MG/DL
LEUKOCYTE ESTERASE UR QL STRIP.AUTO: NEGATIVE
MUCOUS THREADS URNS QL MICRO: ABNORMAL /LPF
NITRITE UR QL STRIP.AUTO: NEGATIVE
PH UR STRIP: 5.5 [PH] (ref 5–8)
PROT UR STRIP-MCNC: NEGATIVE MG/DL
RBC #/AREA URNS HPF: ABNORMAL /HPF (ref 0–5)
SP GR UR REFRACTOMETRY: 1.02 (ref 1–1.03)
UA: UC IF INDICATED,UAUC: ABNORMAL
UROBILINOGEN UR QL STRIP.AUTO: 0.2 EU/DL (ref 0.2–1)
WBC URNS QL MICRO: ABNORMAL /HPF (ref 0–4)

## 2020-12-14 PROCEDURE — 74011250637 HC RX REV CODE- 250/637: Performed by: EMERGENCY MEDICINE

## 2020-12-14 PROCEDURE — 74011000250 HC RX REV CODE- 250: Performed by: EMERGENCY MEDICINE

## 2020-12-14 PROCEDURE — 74011250636 HC RX REV CODE- 250/636: Performed by: EMERGENCY MEDICINE

## 2020-12-14 PROCEDURE — 87491 CHLMYD TRACH DNA AMP PROBE: CPT

## 2020-12-14 PROCEDURE — 72220 X-RAY EXAM SACRUM TAILBONE: CPT

## 2020-12-14 PROCEDURE — 81001 URINALYSIS AUTO W/SCOPE: CPT

## 2020-12-14 PROCEDURE — 96372 THER/PROPH/DIAG INJ SC/IM: CPT

## 2020-12-14 PROCEDURE — 76870 US EXAM SCROTUM: CPT

## 2020-12-14 PROCEDURE — 99284 EMERGENCY DEPT VISIT MOD MDM: CPT

## 2020-12-14 RX ORDER — FAMOTIDINE 20 MG/1
20 TABLET, FILM COATED ORAL
Status: COMPLETED | OUTPATIENT
Start: 2020-12-14 | End: 2020-12-14

## 2020-12-14 RX ORDER — HYDROXYZINE 25 MG/1
50 TABLET, FILM COATED ORAL
Status: COMPLETED | OUTPATIENT
Start: 2020-12-14 | End: 2020-12-14

## 2020-12-14 RX ORDER — AZITHROMYCIN 500 MG/1
1000 TABLET, FILM COATED ORAL
Status: COMPLETED | OUTPATIENT
Start: 2020-12-14 | End: 2020-12-14

## 2020-12-14 RX ORDER — DEXAMETHASONE SODIUM PHOSPHATE 4 MG/ML
10 INJECTION, SOLUTION INTRA-ARTICULAR; INTRALESIONAL; INTRAMUSCULAR; INTRAVENOUS; SOFT TISSUE
Status: COMPLETED | OUTPATIENT
Start: 2020-12-14 | End: 2020-12-14

## 2020-12-14 RX ADMIN — HYDROXYZINE HYDROCHLORIDE 50 MG: 25 TABLET, FILM COATED ORAL at 14:28

## 2020-12-14 RX ADMIN — FAMOTIDINE 20 MG: 20 TABLET ORAL at 14:27

## 2020-12-14 RX ADMIN — DEXAMETHASONE SODIUM PHOSPHATE 10 MG: 4 INJECTION, SOLUTION INTRAMUSCULAR; INTRAVENOUS at 14:27

## 2020-12-14 RX ADMIN — AZITHROMYCIN MONOHYDRATE 1000 MG: 500 TABLET ORAL at 14:27

## 2020-12-14 RX ADMIN — LIDOCAINE HYDROCHLORIDE 250 MG: 10 INJECTION, SOLUTION EPIDURAL; INFILTRATION; INTRACAUDAL; PERINEURAL at 14:27

## 2020-12-14 NOTE — ED NOTES
CC lower back pain (denies injury), STD exposure, and hives (he thinks from new detergent). No distress.

## 2020-12-14 NOTE — ED TRIAGE NOTES
Pt here for F/U but during assessment lower lip is swollen and pt has Hives on arms and abdomin. Pt denies change in toilet products. Pt is speaking in complete sentences.

## 2020-12-14 NOTE — DISCHARGE INSTRUCTIONS
You were evaluated in the emergency department for concern for allergic reaction and hives. Your examination was reassuring as was your work-up including ultrasound and xray. It will be important for you to follow-up with your primary care physician in 3-5 days. If you develop worsening symptoms such as fevers or worsening pain, please return to the emergency department immediately.

## 2020-12-14 NOTE — ED PROVIDER NOTES
EMERGENCY DEPARTMENT HISTORY AND PHYSICAL EXAM      Date: 12/14/2020  Patient Name: Sonja Garcia    History of Presenting Illness     Chief Complaint   Patient presents with    Allergic Reaction     Generalize hives and lower lip swelling x 1week    Exposure to STD       History Provided By: Patient    HPI: Sonja Garcia, 27 y.o. male presents to the ED with cc of diffuse rash, concern for allergic reaction over the past week, concern for STD. Patient states that about 2 weeks ago he was treated for STD, however he states that he has been sexually active with same partner since and believes that he has gotten another STD. He developed rash with hives and wheals over the past week to bilateral upper extremities, abdomen, and low back that is pruritic. Denies any joint pain but he does complain of some swelling that is not present currently. He states that present was located to bilateral hands as well as upper and lower lip but not the tongue, as well as scrotum. He states that swelling has improved now but still wanted to get checked out. Denies any penile discharge. Denies any fevers, chills, change in vision. Additionally he also complains of coccygeal pain that has been present for the past week. Denies any mass or drainage, no recent injury, falls, trauma. States that pain is worse with positional changes. There are no other complaints, changes, or physical findings at this time. PCP: Dionicio Vargas MD    No current facility-administered medications on file prior to encounter. Current Outpatient Medications on File Prior to Encounter   Medication Sig Dispense Refill    hydrOXYzine HCL (ATARAX) 25 mg tablet TAKE 1 TABLET BY MOUTH THREE (3) TIMES DAILY AS NEEDED FOR ANXIETY OR AGITATION 90 Tab 0    busPIRone (BUSPAR) 15 mg tablet TAKE 1 TABLET BY MOUTH THREE TIMES A DAY 90 Tab 0    naltrexone (DEPADE) 50 mg tablet Take 1 Tab by mouth daily.  30 Tab 2    topiramate (TOPAMAX) 50 mg tablet Take 1 Tab by mouth two (2) times a day. 30 Tab 0    pyridoxine, vitamin B6, (VITAMIN B-6) 50 mg tablet Take 1 Tab by mouth daily. (Patient not taking: Reported on 9/24/2020) 30 Tab 0    PARoxetine (PAXIL) 30 mg tablet Take 1 Tab by mouth daily. (Patient not taking: Reported on 9/24/2020) 30 Tab 0       Past History     Past Medical History:  Past Medical History:   Diagnosis Date    Anxiety     HTN (hypertension)        Past Surgical History:  Past Surgical History:   Procedure Laterality Date    HX HEENT      LEFT EAR       Family History:  Family History   Problem Relation Age of Onset    No Known Problems Mother     No Known Problems Father        Social History:  Social History     Tobacco Use    Smoking status: Current Every Day Smoker     Packs/day: 1.00     Years: 12.00     Pack years: 12.00     Types: Cigarettes     Start date: 2008    Smokeless tobacco: Never Used   Substance Use Topics    Alcohol use: Yes     Comment: reports three 40 oz beers per day    Drug use: Yes     Types: Cocaine       Allergies:  No Known Allergies      Review of Systems   Review of Systems   Constitutional: Negative for chills and fever. HENT: Negative. Respiratory: Negative for cough and shortness of breath. Cardiovascular: Negative for chest pain and leg swelling. Gastrointestinal: Negative for abdominal pain, nausea and vomiting. Genitourinary: Positive for dysuria and scrotal swelling. Negative for discharge. Musculoskeletal: Positive for back pain and joint swelling. Negative for arthralgias and gait problem. Skin: Positive for rash. Neurological: Negative for dizziness, weakness, light-headedness, numbness and headaches. Hematological: Does not bruise/bleed easily. All other systems reviewed and are negative. Physical Exam   Physical Exam  Vitals signs and nursing note reviewed. Constitutional:       General: He is not in acute distress. Appearance: Normal appearance.  He is not ill-appearing, toxic-appearing or diaphoretic. HENT:      Head: Normocephalic and atraumatic. Mouth/Throat:      Mouth: Mucous membranes are moist.   Eyes:      Extraocular Movements: Extraocular movements intact. Pupils: Pupils are equal, round, and reactive to light. Cardiovascular:      Rate and Rhythm: Normal rate and regular rhythm. Heart sounds: Normal heart sounds. No murmur. Pulmonary:      Effort: Pulmonary effort is normal. No respiratory distress. Breath sounds: Normal breath sounds. No wheezing. Abdominal:      Palpations: Abdomen is soft. Tenderness: There is no abdominal tenderness. There is no guarding or rebound. Genitourinary:     Penis: Normal.       Scrotum/Testes: Normal.      Comments: Nontender testicles, no masses, no scrotal edema appreciated, no rash, lesion, or penile discharge appreciated. Musculoskeletal: Normal range of motion. General: No swelling or tenderness. Comments: There is mild reproducible TTP located to coccyx and pilonidal area, no evidence of cyst, mass, pilonidal abscess. Skin:     General: Skin is warm and dry. Findings: Rash (There is a mild urticarial rash located to abdomen, bilateral upper extremities with occasional wheals) present. Neurological:      General: No focal deficit present. Mental Status: He is alert and oriented to person, place, and time. Diagnostic Study Results     Labs -     Labs Reviewed   URINALYSIS W/ REFLEX CULTURE - Abnormal; Notable for the following components:       Result Value    Appearance CLOUDY (*)     Ketone >80 (*)     Mucus 2+ (*)     All other components within normal limits   CHLAMYDIA/GC PCR       Radiologic Studies -   US SCROTUM/TESTICLES   Final Result   IMPRESSION:  Normal testicular ultrasound examination.                    XR SACRUM AND COCCYX   Final Result   IMPRESSION: No acute abnormality        CT Results  (Last 48 hours)    None        CXR Results (Last 48 hours)    None          Medical Decision Making   I am the first provider for this patient. I reviewed the vital signs, available nursing notes, past medical history, past surgical history, family history and social history. Vital Signs-Reviewed the patient's vital signs. Visit Vitals  BP (!) 142/102 (BP 1 Location: Left arm, BP Patient Position: At rest)   Pulse 91   Temp 98.6 °F (37 °C)   Resp 16   Ht 5' 11\" (1.803 m)   Wt 98.5 kg (217 lb 2.5 oz)   SpO2 99%   BMI 30.29 kg/m²       Records Reviewed: Nursing Notes, Old Medical Records, Previous Radiology Studies and Previous Laboratory Studies   Personally reviewed ED provider records from 11/29/2020    Provider Notes (Medical Decision Making):   35-year-old male here with multiple complaints including sacrum/coccygeal pain, diffuse blanching urticarial rash, scrotal swelling, lip swelling which have improved. On examination he is afebrile and vital signs stable. No sign of systemic toxicity. He is in no acute distress. Exam as above. I considered reactive arthritis as well as secondary syphilis however exam does not appear consistent with either these. It appears he was recently treated for epididymitis, repeat ultrasound today negative. He could have developed adverse drug reaction from antibiotics prescribed. Plain films negative. I feel he is stable for discharge home with outpatient follow-up. Tested for G/C and empirically treated, encourage no sex for 2 weeks. Patient given strict return precautions. All questions answered and agrees plan. ED Course:   Initial assessment performed. The patients presenting problems have been discussed, and they are in agreement with the care plan formulated and outlined with them. I have encouraged them to ask questions as they arise throughout their visit. TOBACCO COUNSELING:  Upon evaluation, the patient expressed that they are a current tobacco user.  For at least 3 minutes, the patient has been counseled on the dangers of smoking and was encouraged to quit as soon as possible in order to decrease further risks to their health. Patient has conveyed their understanding of the risks involved should they continue to use tobacco products. Patient has been offered various resources to help with their tobacco dependence. Discharge Note:  The patient has been re-evaluated and is ready for discharge. Reviewed available results with patient. Counseled patient on diagnosis and care plan. Patient has expressed understanding, and all questions have been answered. Patient agrees with plan and agrees to follow up as recommended, or to return to the ED if their symptoms worsen. Discharge instructions have been provided and explained to the patient, along with reasons to return to the ED. Disposition:  Discharge home      DISCHARGE PLAN:  1. Discharge Medication List as of 12/14/2020  3:38 PM        2. Follow-up Information     Follow up With Specialties Details Why Contact Info    Joe Calderon MD Family Medicine Schedule an appointment as soon as possible for a visit   [address]  [phone]          3. Return to ED if worse     Diagnosis     Clinical Impression:   1. Urticaria    2. Exposure to STD    3. Coccygeal pain, acute        Attestations:    Jyoti Smith MD    Please note that this dictation was completed with [employer], the computer voice recognition software. Quite often unanticipated grammatical, syntax, homophones, and other interpretive errors are inadvertently transcribed by the computer software. Please disregard these errors. Please excuse any errors that have escaped final proofreading. Thank you.

## 2020-12-18 LAB
C TRACH DNA SPEC QL NAA+PROBE: NEGATIVE
N GONORRHOEA DNA SPEC QL NAA+PROBE: NEGATIVE
SAMPLE TYPE: NORMAL
SERVICE CMNT-IMP: NORMAL
SPECIMEN SOURCE: NORMAL

## 2022-01-20 ENCOUNTER — HOSPITAL ENCOUNTER (OUTPATIENT)
Dept: LAB | Age: 32
Discharge: HOME | End: 2022-01-20
Payer: MEDICAID

## 2022-01-20 PROCEDURE — U0005 INFEC AGEN DETEC AMPLI PROBE: HCPCS

## 2022-01-21 LAB
SARS-COV-2, XPLCVT: NOT DETECTED
SOURCE, COVRS: NORMAL

## 2022-02-16 NOTE — PROGRESS NOTES
HISTORY OF PRESENT ILLNESS  Berta Harkins is a 27 y.o. male. HPI   Pt's main concerns were provided on virtual visit, a telemed format,  pt is w/ comorbid medical history and unaware of been exposed to covid-19 individual, Pt Have been staying at home for couple of wks,  pt has no fever no cough no dyspnea, no ha, not dizzy, nl smell nl taste, no N/V/D, no body ache. Patient's current medical condition is not controlled with medications,   Patient state that it is getting better: pt states and reports of feeling anxius, some guilty feeling, no Hoplessness, patient at this time has ns/nh,ni,nh, and some trouble with weight gain, a better ability of sleep, okay ablitiy  to concentrate at work and at home with the current medications, and all together a safe feeling at home,   Patient states that could not tolerate naltrexone ran out of all her medication has not been drinking since last visit he has been tempted few times so for has been clean needed stronger medication sleeping slightly better gets panic attack irritated and he has been very close to go back and drinking but so far has not done so he has good family support  Current Outpatient Medications   Medication Sig Dispense Refill    naltrexone (DEPADE) 50 mg tablet Take 1 Tab by mouth daily. 30 Tab 2    busPIRone (BUSPAR) 15 mg tablet Take 1 Tab by mouth three (3) times daily. 90 Tab 0    hydrOXYzine HCL (ATARAX) 25 mg tablet Take 1 Tab by mouth three (3) times daily as needed for Anxiety or Agitation for up to 10 days. 90 Tab 0    topiramate (TOPAMAX) 50 mg tablet Take 1 Tab by mouth two (2) times a day. 30 Tab 0    pyridoxine, vitamin B6, (VITAMIN B-6) 50 mg tablet Take 1 Tab by mouth daily. (Patient not taking: Reported on 9/24/2020) 30 Tab 0    PARoxetine (PAXIL) 30 mg tablet Take 1 Tab by mouth daily.  (Patient not taking: Reported on 9/24/2020) 30 Tab 0     No Known Allergies  Past Medical History:   Diagnosis Date    Anxiety     HTN right pneumothorax right pneumothorax right pneumothorax (hypertension)      Past Surgical History:   Procedure Laterality Date    HX HEENT      LEFT EAR     Family History   Problem Relation Age of Onset    No Known Problems Mother     No Known Problems Father      Social History     Tobacco Use    Smoking status: Current Every Day Smoker     Packs/day: 1.00     Years: 12.00     Pack years: 12.00     Types: Cigarettes     Start date: 2008    Smokeless tobacco: Never Used   Substance Use Topics    Alcohol use: Yes     Comment: reports three 40 oz beers per day      Lab Results   Component Value Date/Time    WBC 6.8 11/12/2014 12:49 AM    HGB 12.6 11/12/2014 12:49 AM    HCT 38.9 11/12/2014 12:49 AM    PLATELET 615 (H) 01/15/1775 12:49 AM    MCV 80.2 11/12/2014 12:49 AM     No results found for: CHOL, CHOLPOCT, HDL, LDL, LDLC, LDLCPOC, LDLCEXT, TRIGL, TGLPOCT, CHHD, CHHDX  Lab Results   Component Value Date/Time    ALT (SGPT) 25 11/12/2014 12:49 AM    Alk. phosphatase 64 11/12/2014 12:49 AM    Bilirubin, total 0.3 11/12/2014 12:49 AM    Albumin 3.8 11/12/2014 12:49 AM    Protein, total 7.6 11/12/2014 12:49 AM    PLATELET 715 (H) 24/59/3373 12:49 AM        Review of Systems   Constitutional: Negative for chills, fever and malaise/fatigue. HENT: Negative for ear pain and nosebleeds. Eyes: Negative for blurred vision, pain and discharge. Respiratory: Negative for shortness of breath. Cardiovascular: Negative for chest pain and leg swelling. Gastrointestinal: Negative for constipation, diarrhea, nausea and vomiting. Genitourinary: Negative for frequency. Musculoskeletal: Negative for joint pain. Skin: Negative for itching and rash. Neurological: Negative for headaches. Psychiatric/Behavioral: Positive for depression. Negative for hallucinations, substance abuse and suicidal ideas. The patient is nervous/anxious and has insomnia. Physical Exam  Constitutional:       Appearance: Normal appearance. HENT:      Head: Normocephalic and atraumatic. right pneumothorax right pneumothorax right pneumothorax right pneumothorax Nose: Nose normal. No congestion. Neurological:      Mental Status: He is alert and oriented to person, place, and time. Psychiatric:         Attention and Perception: Perception normal. He is inattentive. He does not perceive auditory or visual hallucinations. Mood and Affect: Mood is anxious and depressed. Mood is not elated. Affect is labile. Affect is not blunt, flat, angry, tearful or inappropriate. Speech: He is communicative. Speech is not rapid and pressured, delayed, slurred or tangential.         Behavior: Behavior is slowed. Behavior is not agitated, aggressive, withdrawn, hyperactive or combative. Thought Content: Thought content normal. Thought content is not paranoid. Thought content does not include homicidal or suicidal ideation. Cognition and Memory: Cognition is not impaired. Memory is not impaired. He does not exhibit impaired recent memory or impaired remote memory. Judgment: Judgment normal. Judgment is not impulsive or inappropriate. ASSESSMENT and PLAN  Diagnoses and all orders for this visit:    1. Uncomplicated alcohol dependence (HCC)  -     naltrexone (DEPADE) 50 mg tablet; Take 1 Tab by mouth daily. -     busPIRone (BUSPAR) 15 mg tablet; Take 1 Tab by mouth three (3) times daily. -     hydrOXYzine HCL (ATARAX) 25 mg tablet; Take 1 Tab by mouth three (3) times daily as needed for Anxiety or Agitation for up to 10 days.  -     REFERRAL TO SOCIAL WORK    2. Advice given about COVID-19 virus infection  -     naltrexone (DEPADE) 50 mg tablet; Take 1 Tab by mouth daily. -     busPIRone (BUSPAR) 15 mg tablet; Take 1 Tab by mouth three (3) times daily. -     hydrOXYzine HCL (ATARAX) 25 mg tablet; Take 1 Tab by mouth three (3) times daily as needed for Anxiety or Agitation for up to 10 days.  -     REFERRAL TO SOCIAL WORK    3. Recurrent depressive disorder (HCC)  -     naltrexone (DEPADE) 50 mg tablet; Take 1 Tab by mouth daily.   - right pneumothorax right pneumothorax right pneumothorax busPIRone (BUSPAR) 15 mg tablet; Take 1 Tab by mouth three (3) times daily. -     hydrOXYzine HCL (ATARAX) 25 mg tablet; Take 1 Tab by mouth three (3) times daily as needed for Anxiety or Agitation for up to 10 days.  -     REFERRAL TO SOCIAL WORK    4. Other fatigue  -     naltrexone (DEPADE) 50 mg tablet; Take 1 Tab by mouth daily. -     busPIRone (BUSPAR) 15 mg tablet; Take 1 Tab by mouth three (3) times daily. -     hydrOXYzine HCL (ATARAX) 25 mg tablet; Take 1 Tab by mouth three (3) times daily as needed for Anxiety or Agitation for up to 10 days.  -     REFERRAL TO SOCIAL WORK          Pt was told to the Initial tx, he will need some form of alcohol counseling and participation in a mutual help group,   Was told that his med the Naltrexone can be initiated while the individual is still drinking, today pt was told that the Abstinence will be the primary goal of treatment of alcohol use disorder      the will evalute in 3 wk to addresse the pt's Response to tx,  And pt will be cont on Medication treatment should ideally be continued for at least a year,   Patient advised to have the mask on most of the time, social distance and handwashing avoid crowded area pursuant to the emergency declaration under the [address] and [place] [address] waiver authority and the [employer] and [employer] Act, this Virtual Visit was conducted, with patient's consent, to reduce the patient's risk of exposure to COVID-19 and provide continuity of care for an established patient  Services were provided through a Video synchronous discussion virtually to substitute for in-person appointment. right pneumothorax right pneumothorax

## 2022-04-26 ENCOUNTER — HOSPITAL ENCOUNTER (EMERGENCY)
Age: 32
Discharge: HOME | End: 2022-04-26
Attending: EMERGENCY MEDICINE
Payer: MEDICAID

## 2022-04-26 VITALS
DIASTOLIC BLOOD PRESSURE: 67 MMHG | WEIGHT: 205 LBS | HEIGHT: 71 IN | OXYGEN SATURATION: 100 % | TEMPERATURE: 99.9 F | BODY MASS INDEX: 28.7 KG/M2 | HEART RATE: 88 BPM | RESPIRATION RATE: 17 BRPM | SYSTOLIC BLOOD PRESSURE: 117 MMHG

## 2022-04-26 DIAGNOSIS — B34.9 VIRAL ILLNESS: Primary | ICD-10-CM

## 2022-04-26 DIAGNOSIS — R10.13 ABDOMINAL PAIN, EPIGASTRIC: ICD-10-CM

## 2022-04-26 LAB
ALBUMIN SERPL-MCNC: 3.9 G/DL (ref 3.5–5)
ALBUMIN/GLOB SERPL: 1.1 {RATIO} (ref 1.1–2.2)
ALP SERPL-CCNC: 91 U/L (ref 45–117)
ALT SERPL-CCNC: 16 U/L (ref 12–78)
ANION GAP SERPL CALC-SCNC: 10 MMOL/L (ref 5–15)
AST SERPL-CCNC: 14 U/L (ref 15–37)
BASOPHILS # BLD: 0 K/UL (ref 0–0.1)
BASOPHILS NFR BLD: 0 % (ref 0–1)
BILIRUB SERPL-MCNC: 0.4 MG/DL (ref 0.2–1)
BUN SERPL-MCNC: 6 MG/DL (ref 6–20)
BUN/CREAT SERPL: 5 (ref 12–20)
CALCIUM SERPL-MCNC: 8.7 MG/DL (ref 8.5–10.1)
CHLORIDE SERPL-SCNC: 102 MMOL/L (ref 97–108)
CO2 SERPL-SCNC: 25 MMOL/L (ref 21–32)
CREAT SERPL-MCNC: 1.22 MG/DL (ref 0.7–1.3)
DIFFERENTIAL METHOD BLD: ABNORMAL
EOSINOPHIL # BLD: 0 K/UL (ref 0–0.4)
EOSINOPHIL NFR BLD: 0 % (ref 0–7)
ERYTHROCYTE [DISTWIDTH] IN BLOOD BY AUTOMATED COUNT: 14.3 % (ref 11.5–14.5)
FLUAV AG NPH QL IA: NEGATIVE
FLUBV AG NOSE QL IA: NEGATIVE
GLOBULIN SER CALC-MCNC: 3.5 G/DL (ref 2–4)
GLUCOSE SERPL-MCNC: 100 MG/DL (ref 65–100)
HCT VFR BLD AUTO: 37.1 % (ref 36.6–50.3)
HGB BLD-MCNC: 12 G/DL (ref 12.1–17)
IMM GRANULOCYTES # BLD AUTO: 0 K/UL (ref 0–0.04)
IMM GRANULOCYTES NFR BLD AUTO: 0 % (ref 0–0.5)
LIPASE SERPL-CCNC: 36 U/L (ref 73–393)
LYMPHOCYTES # BLD: 0.6 K/UL (ref 0.8–3.5)
LYMPHOCYTES NFR BLD: 12 % (ref 12–49)
MCH RBC QN AUTO: 26.1 PG (ref 26–34)
MCHC RBC AUTO-ENTMCNC: 32.3 G/DL (ref 30–36.5)
MCV RBC AUTO: 80.8 FL (ref 80–99)
MONOCYTES # BLD: 1.2 K/UL (ref 0–1)
MONOCYTES NFR BLD: 24 % (ref 5–13)
NEUTS SEG # BLD: 3 K/UL (ref 1.8–8)
NEUTS SEG NFR BLD: 64 % (ref 32–75)
NRBC # BLD: 0 K/UL (ref 0–0.01)
NRBC BLD-RTO: 0 PER 100 WBC
PLATELET # BLD AUTO: 343 K/UL (ref 150–400)
PMV BLD AUTO: 9 FL (ref 8.9–12.9)
POTASSIUM SERPL-SCNC: 3.5 MMOL/L (ref 3.5–5.1)
PROT SERPL-MCNC: 7.4 G/DL (ref 6.4–8.2)
RBC # BLD AUTO: 4.59 M/UL (ref 4.1–5.7)
RBC MORPH BLD: ABNORMAL
SODIUM SERPL-SCNC: 137 MMOL/L (ref 136–145)
WBC # BLD AUTO: 4.8 K/UL (ref 4.1–11.1)

## 2022-04-26 PROCEDURE — 83690 ASSAY OF LIPASE: CPT

## 2022-04-26 PROCEDURE — 96374 THER/PROPH/DIAG INJ IV PUSH: CPT

## 2022-04-26 PROCEDURE — 36415 COLL VENOUS BLD VENIPUNCTURE: CPT

## 2022-04-26 PROCEDURE — 74011250636 HC RX REV CODE- 250/636: Performed by: EMERGENCY MEDICINE

## 2022-04-26 PROCEDURE — 80053 COMPREHEN METABOLIC PANEL: CPT

## 2022-04-26 PROCEDURE — 87804 INFLUENZA ASSAY W/OPTIC: CPT

## 2022-04-26 PROCEDURE — U0005 INFEC AGEN DETEC AMPLI PROBE: HCPCS

## 2022-04-26 PROCEDURE — 96361 HYDRATE IV INFUSION ADD-ON: CPT

## 2022-04-26 PROCEDURE — 85025 COMPLETE CBC W/AUTO DIFF WBC: CPT

## 2022-04-26 PROCEDURE — 99284 EMERGENCY DEPT VISIT MOD MDM: CPT

## 2022-04-26 RX ORDER — METHOCARBAMOL 750 MG/1
750 TABLET, FILM COATED ORAL 4 TIMES DAILY
Qty: 20 TABLET | Refills: 0 | Status: SHIPPED | OUTPATIENT
Start: 2022-04-26 | End: 2022-05-25 | Stop reason: ALTCHOICE

## 2022-04-26 RX ORDER — NAPROXEN 500 MG/1
500 TABLET ORAL
Qty: 20 TABLET | Refills: 0 | Status: SHIPPED | OUTPATIENT
Start: 2022-04-26 | End: 2022-08-16 | Stop reason: ALTCHOICE

## 2022-04-26 RX ORDER — KETOROLAC TROMETHAMINE 30 MG/ML
30 INJECTION, SOLUTION INTRAMUSCULAR; INTRAVENOUS
Status: COMPLETED | OUTPATIENT
Start: 2022-04-26 | End: 2022-04-26

## 2022-04-26 RX ADMIN — KETOROLAC TROMETHAMINE 30 MG: 30 INJECTION, SOLUTION INTRAMUSCULAR; INTRAVENOUS at 08:49

## 2022-04-26 RX ADMIN — SODIUM CHLORIDE 1000 ML: 9 INJECTION, SOLUTION INTRAVENOUS at 08:47

## 2022-04-26 NOTE — ED NOTES
Discharge instructions were given to the patient by Omid Cano RN. The patient left the Emergency Department ambulatory, alert and oriented and in no acute distress with 2 prescriptions. The patient was encouraged to call or return to the ED for worsening issues or problems and was encouraged to schedule a follow up appointment for continuing care. The patient verbalized understanding of discharge instructions and prescriptions, all questions were answered. The patient has no further concerns at this time.

## 2022-04-26 NOTE — ED NOTES
Per pt reports upper abdominal pain that radiates to lower back, +generalized body aches, denies N/V/D. Recent exposure to flu. Pt is alert and oriented x 4, speech is clear, no acute distress noted. Emergency Department Nursing Plan of Care       The Nursing Plan of Care is developed from the Nursing assessment and Emergency Department Attending provider initial evaluation. The plan of care may be reviewed in the ED Provider note.     The Plan of Care was developed with the following considerations:   Patient / Family readiness to learn indicated by:verbalized understanding  Persons(s) to be included in education: patient  Barriers to Learning/Limitations:No    Signed     Nelia Dunn RN    4/26/2022   8:31 AM

## 2022-04-26 NOTE — ED PROVIDER NOTES
EMERGENCY DEPARTMENT HISTORY AND PHYSICAL EXAM      Date: 4/26/2022  Patient Name: Jaime Grijalva    History of Presenting Illness     Chief Complaint   Patient presents with    Generalized Body Aches     History Provided By: Patient    HPI: Jaime Grijalva, 28 y.o. male with past medical history significant for anxiety, hypertension, and alcohol abuse who presents via private vehicle to the ED with cc of epigastric abdominal pain that radiates to the back as well as generalized myalgias for the past 2 days. Patient states he was in the car recently with a person who tested positive for flu. He also states somebody in the alf house recently tested positive for COVID-19. He has taken 2 home tests that were both negative. He denies any fevers, chills, nausea, vomiting, or diarrhea. He denies taking any medications for the symptoms. His pain is described as a dull/aching pain. He denies any modifying factors. As for his myalgias, he does state those are worse with certain movements and positions. PMHx: Anxiety, hypertension, and alcohol abuse (sober for the past 9 months)  Social Hx: Former smoker, history of alcohol abuse, denies illegal drug use    PCP: Vee Hernandez MD    There are no other complaints, changes, or physical findings at this time. No current facility-administered medications on file prior to encounter. Current Outpatient Medications on File Prior to Encounter   Medication Sig Dispense Refill    hydrOXYzine HCL (ATARAX) 25 mg tablet TAKE 1 TABLET BY MOUTH THREE (3) TIMES DAILY AS NEEDED FOR ANXIETY OR AGITATION 90 Tab 0    busPIRone (BUSPAR) 15 mg tablet TAKE 1 TABLET BY MOUTH THREE TIMES A DAY 90 Tab 0    naltrexone (DEPADE) 50 mg tablet Take 1 Tab by mouth daily. 30 Tab 2    topiramate (TOPAMAX) 50 mg tablet Take 1 Tab by mouth two (2) times a day. 30 Tab 0    pyridoxine, vitamin B6, (VITAMIN B-6) 50 mg tablet Take 1 Tab by mouth daily.  (Patient not taking: Reported on 9/24/2020) 30 Tab 0    PARoxetine (PAXIL) 30 mg tablet Take 1 Tab by mouth daily. (Patient not taking: Reported on 9/24/2020) 30 Tab 0     Past History     Past Medical History:  Past Medical History:   Diagnosis Date    Anxiety     HTN (hypertension)      Past Surgical History:  Past Surgical History:   Procedure Laterality Date    HX HEENT      LEFT EAR     Family History:  Family History   Problem Relation Age of Onset    No Known Problems Mother     No Known Problems Father      Social History:  Social History     Tobacco Use    Smoking status: Current Every Day Smoker     Packs/day: 1.00     Years: 12.00     Pack years: 12.00     Types: Cigarettes     Start date: 2008    Smokeless tobacco: Never Used    Tobacco comment: Quit  Aug 2021   Substance Use Topics    Alcohol use: Yes     Comment: reports three 40 oz beers per day    Drug use: Yes     Types: Cocaine     Comment: Per pt reports that he stopped using cocaine in Aug 2021     Allergies:  No Known Allergies  Review of Systems   Review of Systems   Constitutional: Negative for chills and fever. HENT: Negative for congestion, rhinorrhea, sneezing and sore throat. Eyes: Negative for redness and visual disturbance. Respiratory: Negative for shortness of breath. Cardiovascular: Negative for chest pain and leg swelling. Gastrointestinal: Positive for abdominal pain. Negative for nausea and vomiting. Genitourinary: Negative for difficulty urinating and frequency. Musculoskeletal: Positive for back pain and myalgias. Negative for neck stiffness. Skin: Negative for rash. Neurological: Negative for dizziness, syncope, weakness and headaches. Hematological: Negative for adenopathy. All other systems reviewed and are negative. Physical Exam   Physical Exam  Vitals and nursing note reviewed. Constitutional:       Appearance: Normal appearance. He is well-developed. HENT:      Head: Normocephalic and atraumatic.    Cardiovascular: Rate and Rhythm: Regular rhythm. Tachycardia present. Pulses: Normal pulses. Heart sounds: Normal heart sounds. Pulmonary:      Effort: Pulmonary effort is normal. No respiratory distress. Breath sounds: Normal breath sounds. Chest:      Chest wall: No tenderness. Abdominal:      General: Bowel sounds are normal.      Palpations: Abdomen is soft. Tenderness: There is abdominal tenderness in the epigastric area. There is no guarding or rebound. Musculoskeletal:      Cervical back: Full passive range of motion without pain, normal range of motion and neck supple. Skin:     General: Skin is warm and dry. Findings: No erythema or rash. Neurological:      Mental Status: He is alert and oriented to person, place, and time. Psychiatric:         Speech: Speech normal.         Behavior: Behavior normal.         Thought Content: Thought content normal.         Judgment: Judgment normal.       Diagnostic Study Results   Labs -     Recent Results (from the past 12 hour(s))   CBC WITH AUTOMATED DIFF    Collection Time: 04/26/22  8:18 AM   Result Value Ref Range    WBC 4.8 4.1 - 11.1 K/uL    RBC 4.59 4. 10 - 5.70 M/uL    HGB 12.0 (L) 12.1 - 17.0 g/dL    HCT 37.1 36.6 - 50.3 %    MCV 80.8 80.0 - 99.0 FL    MCH 26.1 26.0 - 34.0 PG    MCHC 32.3 30.0 - 36.5 g/dL    RDW 14.3 11.5 - 14.5 %    PLATELET 369 507 - 043 K/uL    MPV 9.0 8.9 - 12.9 FL    NRBC 0.0 0  WBC    ABSOLUTE NRBC 0.00 0.00 - 0.01 K/uL    NEUTROPHILS 64 32 - 75 %    LYMPHOCYTES 12 12 - 49 %    MONOCYTES 24 (H) 5 - 13 %    EOSINOPHILS 0 0 - 7 %    BASOPHILS 0 0 - 1 %    IMMATURE GRANULOCYTES 0 0.0 - 0.5 %    ABS. NEUTROPHILS 3.0 1.8 - 8.0 K/UL    ABS. LYMPHOCYTES 0.6 (L) 0.8 - 3.5 K/UL    ABS. MONOCYTES 1.2 (H) 0.0 - 1.0 K/UL    ABS. EOSINOPHILS 0.0 0.0 - 0.4 K/UL    ABS. BASOPHILS 0.0 0.0 - 0.1 K/UL    ABS. IMM.  GRANS. 0.0 0.00 - 0.04 K/UL    DF SMEAR SCANNED      RBC COMMENTS NORMOCYTIC, NORMOCHROMIC     METABOLIC PANEL, COMPREHENSIVE    Collection Time: 04/26/22  8:18 AM   Result Value Ref Range    Sodium 137 136 - 145 mmol/L    Potassium 3.5 3.5 - 5.1 mmol/L    Chloride 102 97 - 108 mmol/L    CO2 25 21 - 32 mmol/L    Anion gap 10 5 - 15 mmol/L    Glucose 100 65 - 100 mg/dL    BUN 6 6 - 20 MG/DL    Creatinine 1.22 0.70 - 1.30 MG/DL    BUN/Creatinine ratio 5 (L) 12 - 20      GFR est AA >60 >60 ml/min/1.73m2    GFR est non-AA >60 >60 ml/min/1.73m2    Calcium 8.7 8.5 - 10.1 MG/DL    Bilirubin, total 0.4 0.2 - 1.0 MG/DL    ALT (SGPT) 16 12 - 78 U/L    AST (SGOT) 14 (L) 15 - 37 U/L    Alk. phosphatase 91 45 - 117 U/L    Protein, total 7.4 6.4 - 8.2 g/dL    Albumin 3.9 3.5 - 5.0 g/dL    Globulin 3.5 2.0 - 4.0 g/dL    A-G Ratio 1.1 1.1 - 2.2     LIPASE    Collection Time: 04/26/22  8:18 AM   Result Value Ref Range    Lipase 36 (L) 73 - 393 U/L   INFLUENZA A+B VIRAL AGS    Collection Time: 04/26/22  8:21 AM   Result Value Ref Range    Influenza A Antigen Negative NEG      Influenza B Antigen Negative NEG         Radiologic Studies -   No orders to display     No results found. Medical Decision Making   I am the first provider for this patient. I reviewed the vital signs, available nursing notes, past medical history, past surgical history, family history and social history. Vital Signs-Reviewed the patient's vital signs. Patient Vitals for the past 24 hrs:   Temp Pulse Resp BP SpO2   04/26/22 0851 -- 92 20 -- 98 %   04/26/22 0826 -- 95 24 -- 99 %   04/26/22 0758 99.9 °F (37.7 °C) (!) 120 20 117/67 97 %     Pulse Oximetry Analysis - 97% on RA (normal)    Cardiac Monitor:   Rate: 120 bpm  Rhythm: Sinus Tachycardia     Records Reviewed: Nursing Notes and Old Medical Records    Provider Notes (Medical Decision Making):   68-year-old male presents with epigastric abdominal pain that radiates to the back as well as generalized myalgias and pressure behind his eyes. Symptoms have been present for the past 2 days.   Differential includes pancreatitis, GERD, viral illness, influenza, COVID-19, and seasonal allergies. Will swab for COVID and flu, check basic labs to rule out pancreatitis, cholelithiasis, cholecystitis, and treat with IV fluids and Toradol and reassess. ED Course:   Initial assessment performed. The patients presenting problems have been discussed, and they are in agreement with the care plan formulated and outlined with them. I have encouraged them to ask questions as they arise throughout their visit. Progress Note  10:34 AM  I have re-evaluated pt and he states his symptoms are improved but not resolved. He is still complaining of the generalized myalgias. His labs are unremarkable and his vitals have improved. Will discharge with a prescription for naproxen and Robaxin and have him follow-up with outpatient primary care. Progress Note:   Updated pt on all returned results and findings. Discussed the importance of proper follow up as referred below along with return precautions. Pt in agreement with the care plan and expresses agreement with and understanding of all items discussed. Disposition:  Discharge Note:  The pt is ready for discharge. The pt's signs, symptoms, diagnosis, and discharge instructions have been discussed and pt has conveyed their understanding. The pt is to follow up as recommended or return to ER should their symptoms worsen. Plan has been discussed and pt is in agreement. PLAN:  1. Current Discharge Medication List      START taking these medications    Details   methocarbamoL (Robaxin-750) 750 mg tablet Take 1 Tablet by mouth four (4) times daily. Qty: 20 Tablet, Refills: 0  Start date: 4/26/2022      naproxen (NAPROSYN) 500 mg tablet Take 1 Tablet by mouth every twelve (12) hours as needed for Pain. Qty: 20 Tablet, Refills: 0  Start date: 4/26/2022           2.    Follow-up Information     Follow up With Specialties Details Why Sindhu Shah MD Family Medicine Schedule an appointment as soon as possible for a visit   [address]       [address] [facility] EMERGENCY DEPT Emergency Medicine  As needed, If symptoms worsen [address] [facility]  [phone]        Return to ED if worse     Diagnosis     Clinical Impression:   1. Viral illness    2. Abdominal pain, epigastric            Please note that this dictation was completed with Dragon, computer voice recognition software. Quite often unanticipated grammatical, syntax, homophones, and other interpretive errors are inadvertently transcribed by the computer software. Please disregard these errors. Additionally, please excuse any errors that have escaped final proofreading.

## 2022-04-26 NOTE — Clinical Note
[address] EMERGENCY DEPT  [address] [facility] [address] [phone]    Work/School Note    Date: 4/26/2022    To Whom It May concern:    Mario Hernandez was seen and treated today in the emergency room by the following provider(s):  Attending Provider: Ag Muse MD.      Mario Hernandez is excused from work/school on 04/26/22 and 04/27/22. He is medically clear to return to work/school on 4/28/2022.        Sincerely,          Abhijeet Sandoval MD

## 2022-04-26 NOTE — PROGRESS NOTES
Spiritual Care Partner Volunteer visited patient at [facility] in [facility] [place] [facility] [place] EMERGENCY DEPT on 4/26/2022   Documented by: Ayanna Lozada.    Paging Service: [phone] ([phone])

## 2022-04-26 NOTE — ED TRIAGE NOTES
Pt presents with a wide array of symptoms and complaints.  Pt has negative COVID-19 tests and reports taking 1 aleeve PTA

## 2022-04-27 ENCOUNTER — PATIENT OUTREACH (OUTPATIENT)
Dept: CASE MANAGEMENT | Age: 32
End: 2022-04-27

## 2022-04-27 DIAGNOSIS — Z71.89 ADVICE GIVEN ABOUT COVID-19 VIRUS INFECTION: ICD-10-CM

## 2022-04-27 DIAGNOSIS — F33.9 RECURRENT DEPRESSIVE DISORDER (HCC): ICD-10-CM

## 2022-04-27 DIAGNOSIS — R53.83 OTHER FATIGUE: ICD-10-CM

## 2022-04-27 DIAGNOSIS — F10.20 UNCOMPLICATED ALCOHOL DEPENDENCE (HCC): ICD-10-CM

## 2022-04-27 LAB
SARS-COV-2, XPLCVT: DETECTED
SOURCE, COVRS: ABNORMAL

## 2022-04-27 RX ORDER — BUSPIRONE HYDROCHLORIDE 15 MG/1
15 TABLET ORAL 3 TIMES DAILY
Qty: 90 TABLET | Refills: 0 | Status: SHIPPED | OUTPATIENT
Start: 2022-04-27 | End: 2022-05-25 | Stop reason: DRUGHIGH

## 2022-04-27 NOTE — PROGRESS NOTES
ED follow up covid LM  Patient contacted regarding COVID-19 exposure, diagnosis. Discussed COVID-19 related testing which was available at this time. Test results were positive. Patient informed of results, if available? yes. LPN Care Coordinator contacted the patient by telephone to perform post discharge assessment. Call within 2 business days of discharge: Yes Verified name and  with patient as identifiers. Provided introduction to self, and explanation of the CTN/ACM role, and reason for call due to risk factors for infection and/or exposure to COVID-19. Symptoms reviewed with patient who verbalized the following symptoms: fatigue, pain or aching joints and cough      Due to no new or worsening symptoms encounter was not routed to provider for escalation. Discussed follow-up appointments. If no appointment was previously scheduled, appointment scheduling offered:  no. [address] follow up appointment(s): No future appointments. Non-[facility] follow up appointment(s): n/a    Interventions to address risk factors: Education of patient/family/caregiver/guardian to support self-management-VDH and covid numbers given     Advance Care Planning:   Does patient have an Advance Directive: not on file. Educated patient about risk for severe COVID-19 due to risk factors according to CDC guidelines. LPN CC reviewed discharge instructions, medical action plan and red flag symptoms with the patient who verbalized understanding. Discussed COVID vaccination status: no. Education provided on COVID-19 vaccination as appropriate. Discussed exposure protocols and quarantine with CDC Guidelines. Patient was given an opportunity to verbalize any questions and concerns and agrees to contact LPN CC or health care provider for questions related to their healthcare. Reviewed and educated patient on any new and changed medications related to discharge diagnosis     Was patient discharged with a pulse oximeter?  no     LPN CC provided contact information. Plan for follow-up call in 5-7 days based on severity of symptoms and risk factors.

## 2022-05-04 ENCOUNTER — PATIENT OUTREACH (OUTPATIENT)
Dept: CASE MANAGEMENT | Age: 32
End: 2022-05-04

## 2022-05-04 NOTE — PROGRESS NOTES
Patient resolved from Transition of Care episode on 5/4/22. ACM/CTN was unsuccessful at contacting this patient today. Patient/family was provided the following resources and education related to COVID-19 during the initial call:                         Signs, symptoms and red flags related to COVID-19            CDC exposure and quarantine guidelines            [facility]it exposure contact - [phone]            Contact for their local Department of Health                 Patient has not had any additional ED or hospital visits. No further outreach scheduled with this CTN/ACM. Episode of Care resolved. Patient has this CTN/ACM contact information if future needs arise.

## 2022-05-19 ENCOUNTER — HOSPITAL ENCOUNTER (EMERGENCY)
Age: 32
Discharge: HOME | End: 2022-05-19
Attending: EMERGENCY MEDICINE
Payer: MEDICAID

## 2022-05-19 ENCOUNTER — APPOINTMENT (OUTPATIENT)
Dept: GENERAL RADIOLOGY | Age: 32
End: 2022-05-19
Attending: NURSE PRACTITIONER
Payer: MEDICAID

## 2022-05-19 VITALS
BODY MASS INDEX: 28.7 KG/M2 | HEIGHT: 71 IN | OXYGEN SATURATION: 99 % | HEART RATE: 90 BPM | WEIGHT: 205 LBS | SYSTOLIC BLOOD PRESSURE: 151 MMHG | TEMPERATURE: 99.1 F | DIASTOLIC BLOOD PRESSURE: 80 MMHG | RESPIRATION RATE: 18 BRPM

## 2022-05-19 DIAGNOSIS — S93.401A SPRAIN OF RIGHT ANKLE, UNSPECIFIED LIGAMENT, INITIAL ENCOUNTER: Primary | ICD-10-CM

## 2022-05-19 PROCEDURE — 99283 EMERGENCY DEPT VISIT LOW MDM: CPT

## 2022-05-19 PROCEDURE — 73610 X-RAY EXAM OF ANKLE: CPT

## 2022-05-19 PROCEDURE — 73630 X-RAY EXAM OF FOOT: CPT

## 2022-05-19 RX ORDER — IBUPROFEN 800 MG/1
800 TABLET ORAL
Qty: 20 TABLET | Refills: 0 | Status: SHIPPED | OUTPATIENT
Start: 2022-05-19 | End: 2022-05-25 | Stop reason: ALTCHOICE

## 2022-05-19 NOTE — ED NOTES
Patient in a hurry to leave. Patient requesting to be discharged. Paperwork handed to patient and patient walked out of door prior to this RN reviewing information with him. Provider at bedside at time.

## 2022-05-19 NOTE — ED PROVIDER NOTES
EMERGENCY DEPARTMENT HISTORY AND PHYSICAL EXAM    Date: 5/19/2022  Patient Name: Denzel Elena    History of Presenting Illness     Chief Complaint   Patient presents with    Leg Injury         History Provided By: Patient    HPI: Denzel Elena is a 28 y.o. male with a PMH of No significant past medical history who presents with leg injury. Onset 1 hour prior to arrival.  Patient states he was at work when a pallet fell onto his right ankle. Reports pain with walking. Denies swelling, deformity, bruising, redness. Denies previous history of injuries or surgeries to his ankle. He has not taken anything for pain. PCP: Alex Brennan MD    Current Outpatient Medications   Medication Sig Dispense Refill    ibuprofen (MOTRIN) 800 mg tablet Take 1 Tablet by mouth every six (6) hours as needed for Pain for up to 7 days. 20 Tablet 0    busPIRone (BUSPAR) 15 mg tablet Take 1 Tablet by mouth three (3) times daily. 90 Tablet 0    methocarbamoL (Robaxin-750) 750 mg tablet Take 1 Tablet by mouth four (4) times daily. 20 Tablet 0    naproxen (NAPROSYN) 500 mg tablet Take 1 Tablet by mouth every twelve (12) hours as needed for Pain. 20 Tablet 0    hydrOXYzine HCL (ATARAX) 25 mg tablet TAKE 1 TABLET BY MOUTH THREE (3) TIMES DAILY AS NEEDED FOR ANXIETY OR AGITATION 90 Tab 0    naltrexone (DEPADE) 50 mg tablet Take 1 Tab by mouth daily. 30 Tab 2    topiramate (TOPAMAX) 50 mg tablet Take 1 Tab by mouth two (2) times a day. 30 Tab 0    pyridoxine, vitamin B6, (VITAMIN B-6) 50 mg tablet Take 1 Tab by mouth daily. (Patient not taking: Reported on 9/24/2020) 30 Tab 0    PARoxetine (PAXIL) 30 mg tablet Take 1 Tab by mouth daily.  (Patient not taking: Reported on 9/24/2020) 30 Tab 0       Past History     Past Medical History:  Past Medical History:   Diagnosis Date    Anxiety     HTN (hypertension)        Past Surgical History:  Past Surgical History:   Procedure Laterality Date    HX HEENT      LEFT EAR       Family History:  Family History   Problem Relation Age of Onset    No Known Problems Mother     No Known Problems Father        Social History:  Social History     Tobacco Use    Smoking status: Current Every Day Smoker     Packs/day: 1.00     Years: 12.00     Pack years: 12.00     Types: Cigarettes     Start date: 2008    Smokeless tobacco: Never Used    Tobacco comment: Quit  Aug 2021   Substance Use Topics    Alcohol use: Yes     Comment: reports three 40 oz beers per day    Drug use: Yes     Types: Cocaine     Comment: Per pt reports that he stopped using cocaine in Aug 2021       Allergies:  No Known Allergies      Review of Systems   Review of Systems   Constitutional: Negative for chills and fever. Respiratory: Negative for shortness of breath. Cardiovascular: Negative for chest pain. Musculoskeletal: Positive for arthralgias. Negative for back pain, gait problem, joint swelling, myalgias and neck pain. Skin: Negative for color change, pallor and wound. Neurological: Negative for weakness and numbness. All other systems reviewed and are negative. Physical Exam     Vitals:    05/19/22 1630   BP: (!) 151/80   Pulse: 90   Resp: 18   Temp: 99.1 °F (37.3 °C)   SpO2: 99%   Weight: 93 kg (205 lb)   Height: 5' 11\" (1.803 m)     Physical Exam  Vitals and nursing note reviewed. Constitutional:       General: He is not in acute distress. Appearance: He is well-developed. He is not ill-appearing. Cardiovascular:      Rate and Rhythm: Normal rate and regular rhythm. Pulses: Normal pulses. Heart sounds: Normal heart sounds. Pulmonary:      Effort: Pulmonary effort is normal.      Breath sounds: Normal breath sounds. Musculoskeletal:      Cervical back: Normal range of motion and neck supple. Right knee: Normal.      Right lower leg: Normal.      Right ankle: No swelling or deformity. Tenderness present over the lateral malleolus.  Decreased range of motion (pain with dorsiflexion and inversion ). Anterior drawer test negative. Normal pulse. Right Achilles Tendon: Normal.      Right foot: Normal range of motion and normal capillary refill. Tenderness present. No swelling, deformity or crepitus. Normal pulse. Legs:    Lymphadenopathy:      Cervical: No cervical adenopathy. Skin:     General: Skin is warm and dry. Neurological:      Mental Status: He is alert and oriented to person, place, and time. GCS: GCS eye subscore is 4. GCS verbal subscore is 5. GCS motor subscore is 6. Cranial Nerves: No cranial nerve deficit. Psychiatric:         Thought Content: Thought content normal.           Diagnostic Study Results     Labs -   No results found for this or any previous visit (from the past 12 hour(s)). Radiologic Studies -   XR ANKLE RT MIN 3 V   Final Result   No acute abnormality. XR FOOT RT MIN 3 V   Final Result   No acute abnormality. CT Results  (Last 48 hours)    None        CXR Results  (Last 48 hours)    None            Medical Decision Making   I am the first provider for this patient. I reviewed the vital signs, available nursing notes, past medical history, past surgical history, family history and social history. Vital Signs-Reviewed the patient's vital signs. Records Reviewed: Nursing Notes and Old Medical Records    Provider Notes (Medical Decision Making):    27-year-old male presenting with injury to the right ankle status post equipment falling. Exhibiting tenderness to the lateral malleolus and dorsal aspect of right foot. Plan to obtain x-ray for further evaluation to rule out fracture dislocation versus contusion versus sprain. ED Course as of 05/20/22 0016   Thu May 19, 2022   7611 Progress Note:   Xray results are negative. Plan to immobilize  [NA]      ED Course User Index  [NA] Zane Grissom NP          Disposition:  Discharge     DISCHARGE NOTE:       Care plan outlined and precautions discussed.   Patient has no new complaints, changes, or physical findings. All of pt's questions and concerns were addressed. Patient was instructed and agrees to follow up with PCP, as well as to return to the ED upon further deterioration. Patient is ready to go home. Follow-up Information     Follow up With Specialties Details Why Contact Info    Nahid Castaneda DPM Podiatry   [address]  [phone]            Discharge Medication List as of 5/19/2022  5:39 PM      START taking these medications    Details   ibuprofen (MOTRIN) 800 mg tablet Take 1 Tablet by mouth every six (6) hours as needed for Pain for up to 7 days. , Normal, Disp-20 Tablet, R-0         CONTINUE these medications which have NOT CHANGED    Details   busPIRone (BUSPAR) 15 mg tablet Take 1 Tablet by mouth three (3) times daily. , Normal, Disp-90 Tablet, R-0      methocarbamoL (Robaxin-750) 750 mg tablet Take 1 Tablet by mouth four (4) times daily. , Normal, Disp-20 Tablet, R-0      naproxen (NAPROSYN) 500 mg tablet Take 1 Tablet by mouth every twelve (12) hours as needed for Pain., Normal, Disp-20 Tablet, R-0      hydrOXYzine HCL (ATARAX) 25 mg tablet TAKE 1 TABLET BY MOUTH THREE (3) TIMES DAILY AS NEEDED FOR ANXIETY OR AGITATION, Normal, Disp-90 Tab,R-0      naltrexone (DEPADE) 50 mg tablet Take 1 Tab by mouth daily. , No Print, Disp-30 Tab,R-2      topiramate (TOPAMAX) 50 mg tablet Take 1 Tab by mouth two (2) times a day., Normal, Disp-30 Tab, R-0      pyridoxine, vitamin B6, (VITAMIN B-6) 50 mg tablet Take 1 Tab by mouth daily. , Normal, Disp-30 Tab, R-0      PARoxetine (PAXIL) 30 mg tablet Take 1 Tab by mouth daily. , Print, Disp-30 Tab, R-0             Procedures:  Procedures    Please note that this dictation was completed with Dragon, computer voice recognition software. Quite often unanticipated grammatical, syntax, homophones, and other interpretive errors are inadvertently transcribed by the computer software.   Please disregard these errors. Additionally, please excuse any errors that have escaped final proofreading. Diagnosis     Clinical Impression:   1.  Sprain of right ankle, unspecified ligament, initial encounter

## 2022-05-19 NOTE — ED NOTES
Patient declines ace wrap or ankle brace as recommended by provider. Educated on importance of stability and for comfort. Patient still declines. Provider notified.

## 2022-05-19 NOTE — Clinical Note
[facility] EMERGENCY DEPT  [address] [facility] [address] [phone]    Work/School Note    Date: 5/19/2022    To Whom It May concern:    Brianna Main was seen and treated today in the emergency room by the following provider(s):  Attending Provider: Lexy Velez MD  Nurse Practitioner: Horace Smyth NP. Brianna Main is excused from work/school on 05/19/22 and 05/20/22. He is medically clear to return to work/school on 5/21/2022.        Sincerely,          Zane Grissom NP

## 2022-05-19 NOTE — DISCHARGE INSTRUCTIONS
It was a pleasure taking care of you at [facility] Emergency Department today. We know that when you come to [facility], you are entrusting us with your health, comfort, and safety. Our physicians and nurses honor that trust, and we truly appreciate the opportunity to care for you and your loved ones. We also value our feedback. If you receive a survey about your Emergency Department experience today, please fill it out. We care about our patients' feedback, and we listen to what you have to say. Thank you!

## 2022-05-25 ENCOUNTER — VIRTUAL VISIT (OUTPATIENT)
Dept: FAMILY MEDICINE CLINIC | Age: 32
End: 2022-05-25
Payer: MEDICAID

## 2022-05-25 DIAGNOSIS — F33.9 RECURRENT DEPRESSIVE DISORDER (HCC): Primary | ICD-10-CM

## 2022-05-25 DIAGNOSIS — Z71.89 ADVICE GIVEN ABOUT COVID-19 VIRUS INFECTION: ICD-10-CM

## 2022-05-25 PROCEDURE — 99213 OFFICE O/P EST LOW 20 MIN: CPT | Performed by: FAMILY MEDICINE

## 2022-05-25 RX ORDER — BUSPIRONE HYDROCHLORIDE 30 MG/1
30 TABLET ORAL DAILY
Qty: 30 TABLET | Refills: 2 | Status: SHIPPED | OUTPATIENT
Start: 2022-05-25 | End: 2022-06-24

## 2022-05-25 NOTE — PROGRESS NOTES
Chief Complaint   Patient presents with    Medication Refill     talk about mental health        1. \"Have you been to the ER, urgent care clinic since your last visit? Hospitalized since your last visit? \" Yes Where: [facility] [place]     2. \"Have you seen or consulted any other health care providers outside of the [address] [place] [address] since your last visit? \" No     3. For patients aged 39-70: Has the patient had a colonoscopy / FIT/ Cologuard? NA - based on age      If the patient is female:    4. For patients aged 41-77: Has the patient had a mammogram within the past 2 years? NA - based on age or sex      11. For patients aged 21-65: Has the patient had a pap smear?  NA - based on age or sex    Health Maintenance Due   Topic Date Due    Hepatitis C Screening  Never done    COVID-19 Vaccine (1) Never done    Pneumococcal 0-64 years (1 - PCV) Never done    Depression Screen  09/24/2021
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
## 2022-08-09 ENCOUNTER — VIRTUAL VISIT (OUTPATIENT)
Dept: FAMILY MEDICINE CLINIC | Age: 32
End: 2022-08-09
Payer: MEDICAID

## 2022-08-09 DIAGNOSIS — G89.29 HIP PAIN, CHRONIC, RIGHT: Primary | ICD-10-CM

## 2022-08-09 DIAGNOSIS — M25.551 HIP PAIN, CHRONIC, RIGHT: Primary | ICD-10-CM

## 2022-08-09 DIAGNOSIS — F41.1 GAD (GENERALIZED ANXIETY DISORDER): ICD-10-CM

## 2022-08-09 DIAGNOSIS — F41.0 PANIC DISORDER WITHOUT AGORAPHOBIA: ICD-10-CM

## 2022-08-09 PROCEDURE — 99213 OFFICE O/P EST LOW 20 MIN: CPT | Performed by: FAMILY MEDICINE
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
## 2022-08-15 ENCOUNTER — TELEPHONE (OUTPATIENT)
Dept: FAMILY MEDICINE CLINIC | Age: 32
End: 2022-08-15

## 2022-08-15 NOTE — TELEPHONE ENCOUNTER
----- Message from [facility] sent at 8/15/2022  3:44 PM EDT -----  Subject: Message to Provider    QUESTIONS  Information for Provider? pt has been going back and forth with office   regarding a refill.  pt just called and didn't get through please contact   pt back   ---------------------------------------------------------------------------  --------------  Kei ROY  [phone]; OK to leave message on voicemail  ---------------------------------------------------------------------------  --------------  SCRIPT ANSWERS  undefined

## 2022-08-16 RX ORDER — BUSPIRONE HYDROCHLORIDE 30 MG/1
30 TABLET ORAL 3 TIMES DAILY
Qty: 90 TABLET | Refills: 0 | Status: SHIPPED | OUTPATIENT
Start: 2022-08-16 | End: 2022-10-12

## 2022-08-16 RX ORDER — ETODOLAC 400 MG/1
400 TABLET, FILM COATED ORAL
Qty: 40 TABLET | Refills: 0 | Status: SHIPPED | OUTPATIENT
Start: 2022-08-16
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
## 2022-09-21 ENCOUNTER — TRANSCRIBE ORDER (OUTPATIENT)
Dept: SCHEDULING | Age: 32
End: 2022-09-21

## 2022-09-21 DIAGNOSIS — M25.559 PAIN IN UNSPECIFIED HIP: Primary | ICD-10-CM

## 2022-10-11 DIAGNOSIS — F41.0 PANIC DISORDER WITHOUT AGORAPHOBIA: ICD-10-CM

## 2022-10-11 DIAGNOSIS — F41.1 GAD (GENERALIZED ANXIETY DISORDER): ICD-10-CM

## 2022-10-12 RX ORDER — BUSPIRONE HYDROCHLORIDE 30 MG/1
TABLET ORAL
Qty: 90 TABLET | Refills: 0 | Status: SHIPPED | OUTPATIENT
Start: 2022-10-12

## 2024-09-25 ENCOUNTER — OFFICE VISIT (OUTPATIENT)
Age: 34
End: 2024-09-25
Payer: COMMERCIAL

## 2024-09-25 VITALS
TEMPERATURE: 97.1 F | RESPIRATION RATE: 19 BRPM | BODY MASS INDEX: 24.64 KG/M2 | HEART RATE: 65 BPM | DIASTOLIC BLOOD PRESSURE: 87 MMHG | SYSTOLIC BLOOD PRESSURE: 127 MMHG | HEIGHT: 71 IN | OXYGEN SATURATION: 100 % | WEIGHT: 176 LBS

## 2024-09-25 DIAGNOSIS — K42.9 UMBILICAL HERNIA WITHOUT OBSTRUCTION AND WITHOUT GANGRENE: ICD-10-CM

## 2024-09-25 DIAGNOSIS — R11.2 NAUSEA AND VOMITING, UNSPECIFIED VOMITING TYPE: ICD-10-CM

## 2024-09-25 DIAGNOSIS — R10.33 PERIUMBILICAL ABDOMINAL PAIN: Primary | ICD-10-CM

## 2024-09-25 DIAGNOSIS — K59.00 CONSTIPATION, UNSPECIFIED CONSTIPATION TYPE: ICD-10-CM

## 2024-09-25 PROCEDURE — 99214 OFFICE O/P EST MOD 30 MIN: CPT | Performed by: FAMILY MEDICINE

## 2024-09-25 RX ORDER — DOCUSATE SODIUM 100 MG/1
100 CAPSULE, LIQUID FILLED ORAL 2 TIMES DAILY
Qty: 60 CAPSULE | Refills: 0 | Status: SHIPPED | OUTPATIENT
Start: 2024-09-25 | End: 2024-10-25

## 2024-09-25 SDOH — ECONOMIC STABILITY: FOOD INSECURITY: WITHIN THE PAST 12 MONTHS, YOU WORRIED THAT YOUR FOOD WOULD RUN OUT BEFORE YOU GOT MONEY TO BUY MORE.: NEVER TRUE

## 2024-09-25 SDOH — ECONOMIC STABILITY: INCOME INSECURITY: HOW HARD IS IT FOR YOU TO PAY FOR THE VERY BASICS LIKE FOOD, HOUSING, MEDICAL CARE, AND HEATING?: NOT HARD AT ALL

## 2024-09-25 SDOH — ECONOMIC STABILITY: FOOD INSECURITY: WITHIN THE PAST 12 MONTHS, THE FOOD YOU BOUGHT JUST DIDN'T LAST AND YOU DIDN'T HAVE MONEY TO GET MORE.: NEVER TRUE

## 2024-09-25 ASSESSMENT — PATIENT HEALTH QUESTIONNAIRE - PHQ9
1. LITTLE INTEREST OR PLEASURE IN DOING THINGS: NOT AT ALL
SUM OF ALL RESPONSES TO PHQ QUESTIONS 1-9: 0
SUM OF ALL RESPONSES TO PHQ9 QUESTIONS 1 & 2: 0
2. FEELING DOWN, DEPRESSED OR HOPELESS: NOT AT ALL
SUM OF ALL RESPONSES TO PHQ QUESTIONS 1-9: 0

## 2024-10-02 ENCOUNTER — OFFICE VISIT (OUTPATIENT)
Age: 34
End: 2024-10-02
Payer: COMMERCIAL

## 2024-10-02 VITALS
BODY MASS INDEX: 24.39 KG/M2 | RESPIRATION RATE: 15 BRPM | HEIGHT: 71 IN | OXYGEN SATURATION: 99 % | DIASTOLIC BLOOD PRESSURE: 77 MMHG | HEART RATE: 59 BPM | TEMPERATURE: 98.9 F | WEIGHT: 174.2 LBS | SYSTOLIC BLOOD PRESSURE: 132 MMHG

## 2024-10-02 DIAGNOSIS — K43.6 INCARCERATED EPIGASTRIC HERNIA: Primary | ICD-10-CM

## 2024-10-02 PROCEDURE — 99203 OFFICE O/P NEW LOW 30 MIN: CPT | Performed by: SURGERY

## 2024-10-02 ASSESSMENT — PATIENT HEALTH QUESTIONNAIRE - PHQ9
SUM OF ALL RESPONSES TO PHQ QUESTIONS 1-9: 0
2. FEELING DOWN, DEPRESSED OR HOPELESS: NOT AT ALL
SUM OF ALL RESPONSES TO PHQ9 QUESTIONS 1 & 2: 0
SUM OF ALL RESPONSES TO PHQ QUESTIONS 1-9: 0
SUM OF ALL RESPONSES TO PHQ QUESTIONS 1-9: 0
1. LITTLE INTEREST OR PLEASURE IN DOING THINGS: NOT AT ALL
SUM OF ALL RESPONSES TO PHQ QUESTIONS 1-9: 0

## 2024-10-02 NOTE — PROGRESS NOTES
Identified patient with two patient identifiers (name and ). Reviewed chart in preparation for visit and have obtained necessary documentation.    Bryon Gonzales is a 34 y.o. male  Chief Complaint   Patient presents with    New Patient    Hernia     /77 (Site: Right Upper Arm, Position: Sitting, Cuff Size: Large Adult)   Pulse 59   Temp 98.9 °F (37.2 °C) (Oral)   Resp 15   Ht 1.803 m (5' 11\")   Wt 79 kg (174 lb 3.2 oz)   SpO2 99%   BMI 24.30 kg/m²     1. Have you been to the ER, urgent care clinic since your last visit?  Hospitalized since your last visit? Yes [facility]     2. Have you seen or consulted any other health care providers outside of the [facility] since your last visit?  Include any pap smears or colon screening. Yes PCP

## 2024-10-02 NOTE — H&P (VIEW-ONLY)
Surgery History and Physical    Subjective:      Bryon Gonzales  is a 34 y.o.   male who presents with a painful epigastric hernia. Has been present for a while but has recently become quite painful.  No longer reducible..    Past Medical History:   Diagnosis Date    Anxiety     HTN (hypertension)     Incarcerated epigastric hernia 10/02/2024       Past Surgical History:   Procedure Laterality Date    HEENT      LEFT EAR       Social History     Tobacco Use    Smoking status: Every Day     Current packs/day: 1.00     Average packs/day: 1 pack/day for 16.8 years (16.8 ttl pk-yrs)     Types: Cigarettes     Start date: 1/1/2008    Smokeless tobacco: Never    Tobacco comments:     Vape   Substance Use Topics    Alcohol use: Not Currently     Comment: 3 years sober       Family History   Problem Relation Age of Onset    No Known Problems Mother     No Known Problems Father        No current outpatient medications on file prior to visit.     No current facility-administered medications on file prior to visit.       No Known Allergies      Review of Systems:    Pertinent items are noted in the History of Present Illness.    Objective:     Vitals:    10/02/24 1047   BP: 132/77   Pulse: 59   Resp: 15   Temp: 98.9 °F (37.2 °C)   SpO2: 99%        Physical Exam:  GENERAL: alert, appears stated age, and cooperative, LYMPHATIC: Cervical, supraclavicular, and axillary nodes normal., LUNG: clear to auscultation bilaterally, HEART: regular rate and rhythm, S1, S2 normal, no murmur, click, rub or gallop, ABDOMEN: soft, non-tender; bowel sounds normal; no masses,  no organomegaly and epigastric hernia with incarcerated properitoneal fat measuring about 4 cm in  diameter., NEUROLOGIC: negative    Labs: No results found for this or any previous visit (from the past 24 hour(s)).    Data Review:   none    Assessment and Plan:      Diagnosis Orders   1. Incarcerated epigastric hernia            Given the symptoms we agreed

## 2024-10-03 ENCOUNTER — PREP FOR PROCEDURE (OUTPATIENT)
Age: 34
End: 2024-10-03

## 2024-10-03 ENCOUNTER — TELEPHONE (OUTPATIENT)
Age: 34
End: 2024-10-03

## 2024-10-03 NOTE — TELEPHONE ENCOUNTER
Contacted patient to schedule surgery with Dr. Mariscal. Offered patient 10/17 or 10/18. Patient accepted 10/17. Notified him of arrival time and stated that I will put a surgical letter in his MyChart and also mail one to the home address. Patient thanked me for the call.

## 2024-10-07 RX ORDER — SODIUM CHLORIDE 9 MG/ML
INJECTION, SOLUTION INTRAVENOUS PRN
Status: CANCELLED | OUTPATIENT
Start: 2024-10-07

## 2024-10-07 RX ORDER — ACETAMINOPHEN 325 MG/1
1000 TABLET ORAL ONCE
Status: CANCELLED | OUTPATIENT
Start: 2024-10-07 | End: 2024-10-07

## 2024-10-07 RX ORDER — SODIUM CHLORIDE 0.9 % (FLUSH) 0.9 %
5-40 SYRINGE (ML) INJECTION PRN
Status: CANCELLED | OUTPATIENT
Start: 2024-10-07

## 2024-10-07 RX ORDER — SODIUM CHLORIDE 0.9 % (FLUSH) 0.9 %
5-40 SYRINGE (ML) INJECTION EVERY 12 HOURS SCHEDULED
Status: CANCELLED | OUTPATIENT
Start: 2024-10-07

## 2024-10-11 RX ORDER — M-VIT,TX,IRON,MINS/CALC/FOLIC 27MG-0.4MG
1 TABLET ORAL DAILY
COMMUNITY

## 2024-10-11 NOTE — PERIOP NOTE
PAT PHONE INTERVIEW COMPLETED. INSTRUCTIONS EMAILED TO ADDRESS PROVIDED BY PT. OPPORTUNITY FOR QUESTIONS GIVEN.

## 2024-10-11 NOTE — PERIOP NOTE
When you shower the morning of surgery, please do not apply anything to your skin (lotions, powders, deodorant (if having breast surgery), or makeup, especially mascara). Remove fingernail polish except for clear.  Do not shave or trim anywhere 24 hours before surgery  Wear your hair loose or down; no pony-tails, buns, or metal hair clips  Wear loose, comfortable, clean clothes  Wear glasses instead of contacts. Bring a case to keep your glasses safe.  Leave money, valuables, and jewelry, including body piercings, at home     Going Home - or Spending the Night OUTPATIENT SURGERY: You must have a responsible adult drive you home and stay with you 24 hours after surgery. You may not drive for 24 hours after surgery.  ADMITS: If your doctor is keeping you in the hospital after surgery, leave personal belongings/luggage in your car until you have a hospital room number.    Hospital discharge time is 12 noon  Drivers must be here before 12 noon unless you are told differently   Special Instructions Free  parking available from 6 AM until 4:30 PM.         Preventing Infections Before and After - Your Surgery    IMPORTANT INSTRUCTIONS      You play an important role in your health and preparation for surgery. To reduce the germs on your skin you will need to shower with CHG soap (Chorhexidine gluconate 4%) two times before surgery.    CHG soap (Hibiclens, Hex-A-Clens or store brand)   purchase CHG soap at a pharmacy, grocery or department store.  You need to purchase TWO 4 ounce bottles to use for your 2 showers.    Steps to follow:  Wash your hair with your normal shampoo and your body with regular soap and rinse well to remove shampoo and soap from your skin.  Wet a clean washcloth and turn off the shower.  Put CHG soap on washcloth and apply to your entire body from the neck down. Do not use on your head, face or private parts(genitals). Do not use CHG soap on open sores, wounds or areas of skin irritation.  Wash  your body gently for 5 minutes. Do not wash your skin too hard. This soap does not create lather. Pay special attention to your underarms and from your belly button to your feet.  Turn the shower back on and rinse well to get CHG soap off your body.  Pat your skin dry with a clean, dry towel. Do not apply lotions or moisturizer.  Put on clean clothes and sleep on fresh bed sheets and do not allow pets to sleep with you.    Shower with CHG soap 2 times before your surgery  The evening before your surgery  The morning of your surgery      Tips to help prevent infections after your surgery:  Protect your surgical wound from germs:  Hand washing is the most important thing you and your caregivers can do to prevent infections.  Keep your bandage clean and dry!  Do not touch your surgical wound.  Use clean, freshly washed towels and washcloths every time you shower; do not share bath linens with others.  Until your surgical wound is healed, wear clothing and sleep on bed linens that are clean.  Do not allow pets to sleep in your bed with you or touch your surgical wound.  Do not smoke - smoking delays wound healing. This may be a good time to stop smoking.     Follow all instructions so your surgery won’t be cancelled.  Please, be on time.                    If a situation occurs and you are delayed the day of surgery, call [phone]/ [phone].    If your physical condition changes (like a fever, cold, flu, etc.) call your surgeon as soon as possible.    Home medication(s) reviewed and verified via during PAT appointment/call.    The patient was contacted via telephone.     He verbalized understanding of all instructions does not need reinforcement.

## 2024-10-17 ENCOUNTER — ANESTHESIA EVENT (OUTPATIENT)
Facility: HOSPITAL | Age: 34
End: 2024-10-17
Payer: COMMERCIAL

## 2024-10-17 ENCOUNTER — ANESTHESIA (OUTPATIENT)
Facility: HOSPITAL | Age: 34
End: 2024-10-17
Payer: COMMERCIAL

## 2024-10-17 ENCOUNTER — HOSPITAL ENCOUNTER (OUTPATIENT)
Facility: HOSPITAL | Age: 34
Setting detail: OUTPATIENT SURGERY
Discharge: HOME | End: 2024-10-17
Attending: SURGERY | Admitting: SURGERY
Payer: COMMERCIAL

## 2024-10-17 VITALS
HEIGHT: 72 IN | RESPIRATION RATE: 18 BRPM | TEMPERATURE: 97.8 F | HEART RATE: 57 BPM | OXYGEN SATURATION: 98 % | BODY MASS INDEX: 23.7 KG/M2 | SYSTOLIC BLOOD PRESSURE: 122 MMHG | DIASTOLIC BLOOD PRESSURE: 79 MMHG | WEIGHT: 175 LBS

## 2024-10-17 DIAGNOSIS — G89.18 POST-OP PAIN: Primary | ICD-10-CM

## 2024-10-17 PROCEDURE — 3600000012 HC SURGERY LEVEL 2 ADDTL 15MIN: Performed by: SURGERY

## 2024-10-17 PROCEDURE — 7100000000 HC PACU RECOVERY - FIRST 15 MIN: Performed by: SURGERY

## 2024-10-17 PROCEDURE — C9290 INJ, BUPIVACAINE LIPOSOME: HCPCS | Performed by: SURGERY

## 2024-10-17 PROCEDURE — 6360000002 HC RX W HCPCS: Performed by: SURGERY

## 2024-10-17 PROCEDURE — 6360000002 HC RX W HCPCS: Performed by: NURSE ANESTHETIST, CERTIFIED REGISTERED

## 2024-10-17 PROCEDURE — 2500000003 HC RX 250 WO HCPCS: Performed by: SURGERY

## 2024-10-17 PROCEDURE — 3700000000 HC ANESTHESIA ATTENDED CARE: Performed by: SURGERY

## 2024-10-17 PROCEDURE — 7100000001 HC PACU RECOVERY - ADDTL 15 MIN: Performed by: SURGERY

## 2024-10-17 PROCEDURE — 7100000010 HC PHASE II RECOVERY - FIRST 15 MIN: Performed by: SURGERY

## 2024-10-17 PROCEDURE — 6370000000 HC RX 637 (ALT 250 FOR IP): Performed by: SURGERY

## 2024-10-17 PROCEDURE — 3700000001 HC ADD 15 MINUTES (ANESTHESIA): Performed by: SURGERY

## 2024-10-17 PROCEDURE — 2500000003 HC RX 250 WO HCPCS: Performed by: NURSE ANESTHETIST, CERTIFIED REGISTERED

## 2024-10-17 PROCEDURE — 2580000003 HC RX 258: Performed by: ANESTHESIOLOGY

## 2024-10-17 PROCEDURE — 3600000002 HC SURGERY LEVEL 2 BASE: Performed by: SURGERY

## 2024-10-17 PROCEDURE — 49592 RPR AA HRN 1ST < 3 NCR/STRN: CPT | Performed by: SURGERY

## 2024-10-17 PROCEDURE — 2709999900 HC NON-CHARGEABLE SUPPLY: Performed by: SURGERY

## 2024-10-17 PROCEDURE — 2580000003 HC RX 258: Performed by: SURGERY

## 2024-10-17 RX ORDER — SODIUM CHLORIDE 9 MG/ML
INJECTION, SOLUTION INTRAVENOUS PRN
Status: DISCONTINUED | OUTPATIENT
Start: 2024-10-17 | End: 2024-10-17 | Stop reason: HOSPADM

## 2024-10-17 RX ORDER — MIDAZOLAM HYDROCHLORIDE 2 MG/2ML
2 INJECTION, SOLUTION INTRAMUSCULAR; INTRAVENOUS ONCE
Status: DISCONTINUED | OUTPATIENT
Start: 2024-10-17 | End: 2024-10-17 | Stop reason: HOSPADM

## 2024-10-17 RX ORDER — SODIUM CHLORIDE, SODIUM LACTATE, POTASSIUM CHLORIDE, CALCIUM CHLORIDE 600; 310; 30; 20 MG/100ML; MG/100ML; MG/100ML; MG/100ML
INJECTION, SOLUTION INTRAVENOUS CONTINUOUS
Status: DISCONTINUED | OUTPATIENT
Start: 2024-10-17 | End: 2024-10-17 | Stop reason: HOSPADM

## 2024-10-17 RX ORDER — ONDANSETRON 2 MG/ML
INJECTION INTRAMUSCULAR; INTRAVENOUS
Status: DISCONTINUED | OUTPATIENT
Start: 2024-10-17 | End: 2024-10-17 | Stop reason: SDUPTHER

## 2024-10-17 RX ORDER — LIDOCAINE HYDROCHLORIDE 20 MG/ML
INJECTION, SOLUTION EPIDURAL; INFILTRATION; INTRACAUDAL; PERINEURAL
Status: DISCONTINUED | OUTPATIENT
Start: 2024-10-17 | End: 2024-10-17 | Stop reason: SDUPTHER

## 2024-10-17 RX ORDER — OXYCODONE AND ACETAMINOPHEN 5; 325 MG/1; MG/1
1 TABLET ORAL EVERY 6 HOURS PRN
Qty: 12 TABLET | Refills: 0 | Status: SHIPPED | OUTPATIENT
Start: 2024-10-17 | End: 2024-10-20

## 2024-10-17 RX ORDER — SODIUM CHLORIDE 0.9 % (FLUSH) 0.9 %
5-40 SYRINGE (ML) INJECTION EVERY 12 HOURS SCHEDULED
Status: DISCONTINUED | OUTPATIENT
Start: 2024-10-17 | End: 2024-10-17 | Stop reason: HOSPADM

## 2024-10-17 RX ORDER — MIDAZOLAM HYDROCHLORIDE 1 MG/ML
INJECTION INTRAMUSCULAR; INTRAVENOUS
Status: DISCONTINUED | OUTPATIENT
Start: 2024-10-17 | End: 2024-10-17 | Stop reason: SDUPTHER

## 2024-10-17 RX ORDER — SUCCINYLCHOLINE/SOD CL,ISO/PF 200MG/10ML
SYRINGE (ML) INTRAVENOUS
Status: DISCONTINUED | OUTPATIENT
Start: 2024-10-17 | End: 2024-10-17 | Stop reason: SDUPTHER

## 2024-10-17 RX ORDER — LIDOCAINE HYDROCHLORIDE 10 MG/ML
1 INJECTION, SOLUTION EPIDURAL; INFILTRATION; INTRACAUDAL; PERINEURAL
Status: DISCONTINUED | OUTPATIENT
Start: 2024-10-17 | End: 2024-10-17 | Stop reason: HOSPADM

## 2024-10-17 RX ORDER — DEXAMETHASONE SODIUM PHOSPHATE 4 MG/ML
INJECTION, SOLUTION INTRA-ARTICULAR; INTRALESIONAL; INTRAMUSCULAR; INTRAVENOUS; SOFT TISSUE
Status: DISCONTINUED | OUTPATIENT
Start: 2024-10-17 | End: 2024-10-17 | Stop reason: SDUPTHER

## 2024-10-17 RX ORDER — DEXMEDETOMIDINE HYDROCHLORIDE 100 UG/ML
INJECTION, SOLUTION INTRAVENOUS
Status: DISCONTINUED | OUTPATIENT
Start: 2024-10-17 | End: 2024-10-17 | Stop reason: SDUPTHER

## 2024-10-17 RX ORDER — SODIUM CHLORIDE 0.9 % (FLUSH) 0.9 %
5-40 SYRINGE (ML) INJECTION PRN
Status: DISCONTINUED | OUTPATIENT
Start: 2024-10-17 | End: 2024-10-17 | Stop reason: HOSPADM

## 2024-10-17 RX ORDER — ACETAMINOPHEN 500 MG
1000 TABLET ORAL ONCE
Status: DISCONTINUED | OUTPATIENT
Start: 2024-10-17 | End: 2024-10-17 | Stop reason: SDUPTHER

## 2024-10-17 RX ORDER — ACETAMINOPHEN 500 MG
1000 TABLET ORAL ONCE
Status: COMPLETED | OUTPATIENT
Start: 2024-10-17 | End: 2024-10-17

## 2024-10-17 RX ORDER — ROCURONIUM BROMIDE 10 MG/ML
INJECTION, SOLUTION INTRAVENOUS
Status: DISCONTINUED | OUTPATIENT
Start: 2024-10-17 | End: 2024-10-17 | Stop reason: SDUPTHER

## 2024-10-17 RX ORDER — FENTANYL CITRATE 50 UG/ML
INJECTION, SOLUTION INTRAMUSCULAR; INTRAVENOUS
Status: DISCONTINUED | OUTPATIENT
Start: 2024-10-17 | End: 2024-10-17 | Stop reason: SDUPTHER

## 2024-10-17 RX ORDER — FENTANYL CITRATE 50 UG/ML
100 INJECTION, SOLUTION INTRAMUSCULAR; INTRAVENOUS
Status: DISCONTINUED | OUTPATIENT
Start: 2024-10-17 | End: 2024-10-17 | Stop reason: HOSPADM

## 2024-10-17 RX ORDER — BUPIVACAINE HYDROCHLORIDE AND EPINEPHRINE 5; 5 MG/ML; UG/ML
INJECTION, SOLUTION EPIDURAL; INTRACAUDAL; PERINEURAL PRN
Status: DISCONTINUED | OUTPATIENT
Start: 2024-10-17 | End: 2024-10-17 | Stop reason: HOSPADM

## 2024-10-17 RX ORDER — PROPOFOL 10 MG/ML
INJECTION, EMULSION INTRAVENOUS
Status: DISCONTINUED | OUTPATIENT
Start: 2024-10-17 | End: 2024-10-17 | Stop reason: SDUPTHER

## 2024-10-17 RX ADMIN — ACETAMINOPHEN 1000 MG: 500 TABLET ORAL at 07:26

## 2024-10-17 RX ADMIN — SUGAMMADEX 200 MG: 100 INJECTION, SOLUTION INTRAVENOUS at 09:25

## 2024-10-17 RX ADMIN — SODIUM CHLORIDE, POTASSIUM CHLORIDE, SODIUM LACTATE AND CALCIUM CHLORIDE: 600; 310; 30; 20 INJECTION, SOLUTION INTRAVENOUS at 07:36

## 2024-10-17 RX ADMIN — Medication 160 MG: at 08:30

## 2024-10-17 RX ADMIN — DEXAMETHASONE SODIUM PHOSPHATE 4 MG: 4 INJECTION, SOLUTION INTRAMUSCULAR; INTRAVENOUS at 08:52

## 2024-10-17 RX ADMIN — ONDANSETRON 4 MG: 2 INJECTION INTRAMUSCULAR; INTRAVENOUS at 09:07

## 2024-10-17 RX ADMIN — MIDAZOLAM 3 MG: 1 INJECTION INTRAMUSCULAR; INTRAVENOUS at 08:13

## 2024-10-17 RX ADMIN — FENTANYL CITRATE 100 MCG: 50 INJECTION, SOLUTION INTRAMUSCULAR; INTRAVENOUS at 08:29

## 2024-10-17 RX ADMIN — ROCURONIUM BROMIDE 5 MG: 10 SOLUTION INTRAVENOUS at 08:29

## 2024-10-17 RX ADMIN — WATER 2000 MG: 1 INJECTION INTRAMUSCULAR; INTRAVENOUS; SUBCUTANEOUS at 08:33

## 2024-10-17 RX ADMIN — LIDOCAINE HYDROCHLORIDE 100 MG: 20 INJECTION, SOLUTION EPIDURAL; INFILTRATION; INTRACAUDAL; PERINEURAL at 08:29

## 2024-10-17 RX ADMIN — MIDAZOLAM 2 MG: 1 INJECTION INTRAMUSCULAR; INTRAVENOUS at 08:22

## 2024-10-17 RX ADMIN — PROPOFOL 200 MG: 10 INJECTION, EMULSION INTRAVENOUS at 08:29

## 2024-10-17 RX ADMIN — DEXMEDETOMIDINE 16 MCG: 100 INJECTION, SOLUTION, CONCENTRATE INTRAVENOUS at 08:45

## 2024-10-17 RX ADMIN — ROCURONIUM BROMIDE 25 MG: 10 SOLUTION INTRAVENOUS at 08:39

## 2024-10-17 ASSESSMENT — PAIN - FUNCTIONAL ASSESSMENT: PAIN_FUNCTIONAL_ASSESSMENT: 0-10

## 2024-10-17 NOTE — PERIOP NOTE
When confirming pt family contact during pre-procedure interview, pt requested not to have family updated during procedure.

## 2024-10-17 NOTE — OP NOTE
[address]  [address]                            OPERATIVE REPORT      PATIENT NAME: LOLLY STAPLES                : 1990  MED REC NO: [id number]                       ROOM: OR  ACCOUNT NO: [id number]                       ADMIT DATE: 10/17/2024  PROVIDER: Alejandra Mariscal MD    DATE OF SERVICE:  10/17/2024    PREOPERATIVE DIAGNOSES:  Incarcerated epigastric hernia.    POSTOPERATIVE DIAGNOSES:  Incarcerated epigastric hernia.    PROCEDURES PERFORMED:  Repair of incarcerated epigastric hernia.    SURGEON:  Alejandar Mariscal MD    ASSISTANT:  Karon Feliz SA.    ANESTHESIA:  General supplemented with Exparel.    ESTIMATED BLOOD LOSS:  Minimal.    SPECIMENS REMOVED:  None.    INTRAOPERATIVE FINDINGS:  No infection present and the defect was 2 cm in size.     COMPLICATIONS:  None.    IMPLANTS:  None.    INDICATIONS:  Hernia.    DESCRIPTION OF PROCEDURE:  With the patient supine and suitably anesthetized, the abdomen was prepped with ChloraPrep and draped as a field.  0.5% Marcaine with epinephrine was infiltrated in the skin and a 3.5 cm vertical incision was made and carried down through the skin and subcutaneous tissues to the herniating fat which was then dissected free early down to the neck of the hernia, which was then cleared circumferentially.  The herniating fat was then replaced back into the subfascial tissues and the defect was then closed with interrupted 0 Ethibond sutures.  Exparel was then infiltrated all around the area.  The subcutaneous tissues were reapproximated with Vicryl.  The skin was closed with subcuticular Monocryl followed by Dermabond.  At the termination of the procedure, all counts were correct.  The patient tolerated this well and was brought to the PACU in satisfactory condition.    DRAINS:  None.        ALEJANDRA MARISCAL MD      GAP/AQS  D:  10/17/2024 10:16:08  T:  10/17/2024 10:50:16  JOB #:

## 2024-10-17 NOTE — ANESTHESIA POSTPROCEDURE EVALUATION
Post-Anesthesia Evaluation and Assessment    Patient: Bryon Gonzales MRN: [id number]  SSN: [id number]    YOB: 1990  Age: 34 y.o.  Sex: male      I have evaluated the patient and they are stable and ready for discharge from the PACU.     Cardiovascular Function/Vital Signs  Visit Vitals  /79   Pulse 57   Temp 97.8 °F (36.6 °C) (Oral)   Resp 18   Ht 1.829 m (6')   Wt 79.4 kg (175 lb)   SpO2 98%   BMI 23.73 kg/m²       Patient is status post General anesthesia for Procedure(s):  REPAIR OF INCARCERATED EPIGASTRIC HERNIA.    Nausea/Vomiting: None    Postoperative hydration reviewed and adequate.    Pain:      Managed    Neurological Status:       At baseline    Mental Status, Level of Consciousness: Alert and  oriented to person, place, and time    Pulmonary Status:       Adequate oxygenation and airway patent    Complications related to anesthesia: None    Post-anesthesia assessment completed. No concerns    Signed By: Kevin Strickland MD     October 17, 2024            Department of Anesthesiology  Postprocedure Note    Patient: Bryon Gonzales  MRN: [id number]  YOB: 1990  Date of evaluation: 10/17/2024    Procedure Summary       Date: 10/17/24 Room / Location: [facility] MAIN OR [address] [place] [address] MAIN OR    Anesthesia Start: 0813 Anesthesia Stop: 0949    Procedure: REPAIR OF INCARCERATED EPIGASTRIC HERNIA (Abdomen) Diagnosis:       Incarcerated epigastric hernia      (Incarcerated epigastric hernia [K43.6])    Providers: Grzegorz Mariscal MD Responsible Provider: Kevin Strickland MD    Anesthesia Type: General ASA Status: 2            Anesthesia Type: General    Ivette Phase I: Ivette Score: 10    Ivette Phase II: Ivette Score: 10    Anesthesia Post Evaluation    No notable events documented.

## 2024-10-17 NOTE — DISCHARGE INSTRUCTIONS
strain. This may include heavy grocery bags and milk containers, a heavy briefcase or backpack, cat litter or dog food bags, a vacuum , or a child.     You may drive when you are no longer taking pain medicine and can quickly move your foot from the gas pedal to the brake. You must also be able to sit comfortably for a long period of time, even if you do not plan to go far. You might get caught in traffic.     Most people are able to return to work within 1 to 2 weeks after surgery.     You may shower 24 to 48 hours after surgery, if your doctor okays it. Pat the cut (incision) dry. Do not take a bath for the first 2 weeks, or until your doctor tells you it is okay.     Your doctor will tell you when you can have sex again.   Diet    You can eat your normal diet. If your stomach is upset, try bland, low-fat foods like plain rice, broiled chicken, toast, and yogurt.     Drink plenty of fluids (unless your doctor tells you not to).     You may notice that your bowel movements are not regular right after your surgery. This is common. Avoid constipation and straining with bowel movements. You may want to take a fiber supplement every day. If you have not had a bowel movement after a couple of days, ask your doctor about taking a mild laxative.   Medicines    Your doctor will tell you if and when you can restart your medicines. He or she will also give you instructions about taking any new medicines.     If you stopped taking aspirin or some other blood thinner, your doctor will tell you when to start taking it again.     Be safe with medicines. Take pain medicines exactly as directed.  If the doctor gave you a prescription medicine for pain, take it as prescribed.  If you are not taking a prescription pain medicine, take an over-the-counter medicine such as acetaminophen (Tylenol), ibuprofen (Advil, Motrin), or naproxen (Aleve). Read and follow all instructions on the label.  Do not take two or more pain  draining from the incision.  A fever.   Watch closely for any changes in your health, and be sure to contact your doctor if you have any problems.  Where can you learn more?  Go to https://www.[employer].net/patientEd and enter G367 to learn more about \"Hernia Repair: What to Expect at Home.\"  Current as of: July 26, 2023  Content Version: 14.2  © 2024 [employer].   Care instructions adapted under license by [employer]. If you have questions about a medical condition or this instruction, always ask your healthcare professional. [employer] disclaims any warranty or liability for your use of this information.

## 2024-10-17 NOTE — INTERVAL H&P NOTE
Update History & Physical    The patient's History and Physical of October 2, 2024 was reviewed with the patient and I examined the patient. There was no change. The surgical site was confirmed by the patient and me.     Plan: The risks, benefits, expected outcome, and alternative to the recommended procedure have been discussed with the patient. Patient understands and wants to proceed with the procedure.     Electronically signed by Grzegorz Mariscal MD on 10/17/2024 at 7:34 AM

## 2024-10-17 NOTE — BRIEF OP NOTE
Brief Postoperative Note      Patient: Bryon Gonzales  YOB: 1990  MRN: [id number]    Date of Procedure: 10/17/2024    Pre-Op Diagnosis Codes:      * Incarcerated epigastric hernia [K43.6]    Post-Op Diagnosis: Same       Procedure(s):  REPAIR OF INCARCERATED EPIGASTRIC HERNIA    Surgeon(s):  Grzegorz Mariscal MD    Assistant:  Surgical Assistant: Karon Feliz    Anesthesia: General    Estimated Blood Loss (mL): Minimal    Complications: None    Specimens:   * No specimens in log *    Implants:  * No implants in log *      Drains: * No LDAs found *    Findings:  Infection Present At Time Of Surgery (PATOS) (choose all levels that have infection present):  No infection present  Other Findings: 2 cm defect    Electronically signed by Grzegorz Mariscal MD on 10/17/2024 at 9:27 AM    [id number]

## (undated) DEVICE — SUTURE MONOCRYL + SZ 4-0 L27IN ABSRB UD L19MM PS-2 3/8 CIR MCP426H

## (undated) DEVICE — TOWEL,OR,DSP,ST,BLUE,STD,4/PK,20PK/CS: Brand: MEDLINE

## (undated) DEVICE — SUTURE ETHIBOND EXCEL SZ 0 L18IN NONABSORBABLE GRN L26MM CT-2 CX27D

## (undated) DEVICE — HYPODERMIC SAFETY NEEDLE: Brand: MONOJECT

## (undated) DEVICE — SYRINGE MED 10ML LUERLOCK TIP W/O SFTY DISP

## (undated) DEVICE — PENCIL SMK EVAC 10 FT BLADE ELECTRD ROCKER FOR TELSCP

## (undated) DEVICE — GARMENT,MEDLINE,DVT,INT,CALF,MED, GEN2: Brand: MEDLINE

## (undated) DEVICE — GLOVE ORANGE PI 7 1/2   MSG9075

## (undated) DEVICE — SOLUTION IRRIG 1000ML STRL H2O USP PLAS POUR BTL

## (undated) DEVICE — PACK,LAPAROTOMY,2 REINFORCED GOWNS: Brand: MEDLINE

## (undated) DEVICE — BASIN ST MAJOR-NO CAUTERY: Brand: MEDLINE INDUSTRIES, INC.

## (undated) DEVICE — LIQUIBAND RAPID ADHESIVE 36/CS 0.8ML: Brand: MEDLINE

## (undated) DEVICE — ELECTRODE PT RET AD L9FT HI MOIST COND ADH HYDRGEL CORDED

## (undated) DEVICE — X-RAY DETECTABLE SPONGES,16 PLY: Brand: VISTEC

## (undated) DEVICE — SOLUTION IRRIG 1000ML 0.9% SOD CHL USP POUR PLAS BTL

## (undated) DEVICE — SUTURE VICRYL + SZ 3-0 L27IN ABSRB UD L26MM SH 1/2 CIR VCP416H

## (undated) DEVICE — DRAPE,UTILTY,TAPE,15X26, 4EA/PK: Brand: MEDLINE

## (undated) DEVICE — APPLICATOR MEDICATED 26 CC SOLUTION HI LT ORNG CHLORAPREP